# Patient Record
Sex: MALE | Race: WHITE | NOT HISPANIC OR LATINO | Employment: UNEMPLOYED | URBAN - METROPOLITAN AREA
[De-identification: names, ages, dates, MRNs, and addresses within clinical notes are randomized per-mention and may not be internally consistent; named-entity substitution may affect disease eponyms.]

---

## 2017-03-03 ENCOUNTER — OFFICE VISIT (OUTPATIENT)
Dept: MEDICAL GROUP | Facility: PHYSICIAN GROUP | Age: 57
End: 2017-03-03
Payer: MEDICAID

## 2017-03-03 VITALS
TEMPERATURE: 97 F | DIASTOLIC BLOOD PRESSURE: 90 MMHG | BODY MASS INDEX: 33.13 KG/M2 | HEIGHT: 73 IN | RESPIRATION RATE: 12 BRPM | WEIGHT: 250 LBS | OXYGEN SATURATION: 95 % | SYSTOLIC BLOOD PRESSURE: 132 MMHG | HEART RATE: 87 BPM

## 2017-03-03 DIAGNOSIS — Z09 HOSPITAL DISCHARGE FOLLOW-UP: ICD-10-CM

## 2017-03-03 DIAGNOSIS — F31.4 BIPOLAR DISORDER, CURRENT EPISODE DEPRESSED, SEVERE, WITHOUT PSYCHOTIC FEATURES (HCC): ICD-10-CM

## 2017-03-03 DIAGNOSIS — Z76.0 ENCOUNTER FOR MEDICATION REFILL: ICD-10-CM

## 2017-03-03 PROCEDURE — 99214 OFFICE O/P EST MOD 30 MIN: CPT | Performed by: NURSE PRACTITIONER

## 2017-03-03 RX ORDER — LURASIDONE HYDROCHLORIDE 60 MG/1
TABLET, FILM COATED ORAL
Refills: 0 | COMMUNITY
Start: 2016-12-30 | End: 2017-03-03

## 2017-03-03 RX ORDER — DOXEPIN HYDROCHLORIDE 25 MG/1
50 CAPSULE ORAL
Refills: 0 | COMMUNITY
Start: 2017-02-23 | End: 2017-04-12

## 2017-03-03 RX ORDER — ESCITALOPRAM OXALATE 10 MG/1
5 TABLET ORAL DAILY
Refills: 0 | COMMUNITY
Start: 2016-12-29 | End: 2017-03-03

## 2017-03-03 RX ORDER — LURASIDONE HYDROCHLORIDE 40 MG/1
TABLET, FILM COATED ORAL
Refills: 0 | COMMUNITY
Start: 2017-02-24 | End: 2017-05-08

## 2017-03-03 RX ORDER — LURASIDONE HYDROCHLORIDE 80 MG/1
TABLET, FILM COATED ORAL
Refills: 0 | COMMUNITY
Start: 2017-01-30 | End: 2017-03-03

## 2017-03-03 RX ORDER — ALPRAZOLAM 0.5 MG/1
0.5 TABLET ORAL 4 TIMES DAILY PRN
COMMUNITY
Start: 2017-03-01 | End: 2017-03-14 | Stop reason: SDUPTHER

## 2017-03-03 RX ORDER — QUETIAPINE FUMARATE 50 MG/1
TABLET, FILM COATED ORAL
COMMUNITY
Start: 2017-02-28 | End: 2017-03-14

## 2017-03-03 NOTE — MR AVS SNAPSHOT
"        Francois Luis   3/3/2017 4:20 PM   Office Visit   MRN: [id number]    Department:  [facility]   Dept Phone:  [phone]    Description:  Male : 1960   Provider:  JUANJO Miller           Reason for Visit     Hospital Follow-up recent [facility] visit      Allergies as of 3/3/2017     Allergen Noted Reactions    Diflunisal 2010       Sweating and weakness    Lidocaine 2009   Nausea    Nabumetone 2009   Nausea    Prednisone 2012       Agitation  and Depression      Vital Signs     Blood Pressure Pulse Temperature Respirations Height Weight    132/90 mmHg 87 36.1 °C (97 °F) 12 1.854 m (6' 1\") 113.399 kg (250 lb)    Body Mass Index Oxygen Saturation Smoking Status             32.99 kg/m2 95% Never Smoker          Basic Information     Date Of Birth Sex Race Ethnicity Preferred Language    1960 Male White Non- English      Problem List              ICD-10-CM Priority Class Noted - Resolved    Bipolar disorder (HCC) F31.9   2009 - Present    Irritable bowel K58.9   2009 - Present    Cervical stenosis of spinal canal M48.02   2009 - Present    ADHD (attention deficit hyperactivity disorder) F90.9   2010 - Present    Allergic rhinitis due to pollen J30.1   2010 - Present    Hypertension I10   2010 - Present    Complete rupture of rotator cuff M75.120   2012 - Present    Type 2 diabetes mellitus without complication (CMS-HCC) E11.9   8/10/2015 - Present    Hyperlipidemia E78.5   2015 - Present    Elevated liver enzymes R74.8   2016 - Present      Health Maintenance        Date Due Completion Dates    IMM HEP B VACCINE (1 of 3 - Primary Series) 1960 ---    IMM DTaP/Tdap/Td Vaccine (1 - Tdap) 6/10/1978 ---    IMM PNEUMOCOCCAL 19-64 (ADULT) MEDIUM RISK SERIES (1 of 1 - PPSV23) 6/10/1978 ---    URINE ACR / MICROALBUMIN 2007    DIABETES MONOFILAMENT / LE EXAM 2017 (Done)    Override on 2016: " Done    A1C SCREENING 5/8/2017 11/8/2016, 9/2/2015, 1/18/2006    RETINAL SCREENING 7/18/2017 7/18/2016, 4/30/2015    FASTING LIPID PROFILE 11/8/2017 11/8/2016, 9/2/2015, 1/18/2006    SERUM CREATININE 11/8/2017 11/8/2016, 9/2/2015, 8/21/2012, 2/27/2012, 7/25/2009, 1/18/2006    COLONOSCOPY 11/21/2024 11/21/2014 (Postponed)    Override on 11/21/2014: Postponed            Current Immunizations     Influenza TIV (IM) 7/1/2015    Influenza Vaccine Quad Inj (Preserved) 10/27/2016      Below and/or attached are the medications your provider expects you to take. Review all of your home medications and newly ordered medications with your provider and/or pharmacist. Follow medication instructions as directed by your provider and/or pharmacist. Please keep your medication list with you and share with your provider. Update the information when medications are discontinued, doses are changed, or new medications (including over-the-counter products) are added; and carry medication information at all times in the event of emergency situations     Allergies:  DIFLUNISAL - (reactions not documented)     LIDOCAINE - Nausea     NABUMETONE - Nausea     PREDNISONE - (reactions not documented)               Medications  Valid as of: March 03, 2017 -  5:03 PM    Generic Name Brand Name Tablet Size Instructions for use    ALPRAZolam (Tab) XANAX 0.5 MG Take 0.5 mg by mouth 4 times a day as needed.        AmLODIPine Besylate (Tab) NORVASC 2.5 MG Take 1 Tab by mouth every day.        Atorvastatin Calcium (Tab) LIPITOR 40 MG Take 1 Tab by mouth every bedtime.        DiphenhydrAMINE HCl (Tab) BENADRYL 25 MG Take 25 mg by mouth at bedtime as needed for Sleep.        Doxepin HCl (Cap) SINEQUAN 25 MG Take 50 mg by mouth every bedtime.        Hyoscyamine Sulfate (SL Tab) LEVSIN 0.125 MG DISSOLVE 1 TABLET BY MOUTH EVERY 4 HOURS AS NEEDED FOR CRAMPING        Lurasidone HCl (Tab) LATUDA 40 MG TAKE 1 TABLET BY MOUTH EVERY EVENING WITH MEALS         MetFORMIN HCl (Tab) GLUCOPHAGE 500 MG Take 1 Tab by mouth 2 times a day, with meals.        Metoprolol Tartrate (Tab) LOPRESSOR 25 MG Take 25 mg by mouth 2 times a day.        Psyllium (Pack) METAMUCIL 58.12 % Take 1 Packet by mouth every day.        QUEtiapine Fumarate (Tab) SEROQUEL 50 MG         .                 Medicines prescribed today were sent to:     [facility]ACY #[id number] - [place], [place] - [address]    [address] [facility] [place] [address]    Phone: [phone] Fax: [phone]    Open 24 Hours?: No      Medication refill instructions:       If your prescription bottle indicates you have medication refills left, it is not necessary to call your provider’s office. Please contact your pharmacy and they will refill your medication.    If your prescription bottle indicates you do not have any refills left, you may request refills at any time through one of the following ways: The online [employer] system (except Urgent Care), by calling your provider’s office, or by asking your pharmacy to contact your provider’s office with a refill request. Medication refills are processed only during regular business hours and may not be available until the next business day. Your provider may request additional information or to have a follow-up visit with you prior to refilling your medication.   *Please Note: Medication refills are assigned a new Rx number when refilled electronically. Your pharmacy may indicate that no refills were authorized even though a new prescription for the same medication is available at the pharmacy. Please request the medicine by name with the pharmacy before contacting your provider for a refill.           MyChart Status: Patient Declined

## 2017-03-14 ENCOUNTER — OFFICE VISIT (OUTPATIENT)
Dept: MEDICAL GROUP | Facility: PHYSICIAN GROUP | Age: 57
End: 2017-03-14
Payer: MEDICAID

## 2017-03-14 VITALS
TEMPERATURE: 96.6 F | BODY MASS INDEX: 33.4 KG/M2 | HEIGHT: 73 IN | HEART RATE: 67 BPM | OXYGEN SATURATION: 95 % | RESPIRATION RATE: 12 BRPM | DIASTOLIC BLOOD PRESSURE: 90 MMHG | SYSTOLIC BLOOD PRESSURE: 118 MMHG | WEIGHT: 252 LBS

## 2017-03-14 DIAGNOSIS — R00.2 INTERMITTENT PALPITATIONS: ICD-10-CM

## 2017-03-14 DIAGNOSIS — F31.4 BIPOLAR DISORDER, CURRENT EPISODE DEPRESSED, SEVERE, WITHOUT PSYCHOTIC FEATURES (HCC): ICD-10-CM

## 2017-03-14 PROCEDURE — 99214 OFFICE O/P EST MOD 30 MIN: CPT | Performed by: NURSE PRACTITIONER

## 2017-03-14 RX ORDER — ALPRAZOLAM 0.5 MG/1
0.5 TABLET ORAL 4 TIMES DAILY PRN
Qty: 120 TAB | Refills: 0 | Status: SHIPPED
Start: 2017-03-14 | End: 2018-08-13 | Stop reason: SDUPTHER

## 2017-03-14 RX ORDER — DIAZEPAM 10 MG/1
10 TABLET ORAL 2 TIMES DAILY PRN
Refills: 0 | COMMUNITY
Start: 2017-02-24 | End: 2017-03-14

## 2017-03-14 RX ORDER — PROPRANOLOL HYDROCHLORIDE 40 MG/1
40 TABLET ORAL 2 TIMES DAILY
Qty: 60 TAB | Refills: 0 | Status: SHIPPED | OUTPATIENT
Start: 2017-03-14 | End: 2017-04-11 | Stop reason: SDUPTHER

## 2017-03-14 NOTE — ASSESSMENT & PLAN NOTE
New problem diagnosed at [facility] a few weeks ago. He was started on metoprolol 25 mg BID after cardiac work up was negative. Patient has increased the metoprolol to 50 mg BID on his own as of the past week and he did notice improvement in heart pounding and palpitations, but they are still occurring frequently. They are worse at night.

## 2017-03-14 NOTE — ASSESSMENT & PLAN NOTE
Ongoing problem that patient feels is getting worse. He is followed by psychiatry very closely via phone and appointments monthly with Dr. Benavidez. Only 2 days ago, Seroquel was discontinued. Since being hospitalized, he has been using Xanax rather than Valium which he reports is working much better for his anxiety. Next appointment in two weeks with psych. Patient states he can contact him by phone anytime though for medication changes. At last visit, I had recommended different community resources for counseling, the patient reports these are booked out anywhere from 3-5 months therefore he did not make appointment. He denies any suicidal ideations but reports that he feels like his anxiety and depression is severe.

## 2017-03-14 NOTE — PROGRESS NOTES
Subjective:     Chief Complaint   Patient presents with   • Hospital Follow-up     recent [facility] visit       HPI  Francois Luis is a 56 y.o. male here today for hospital follow up visit. He was seen in [facility] ED multiple times for c/o palpitations while a patient with [facility] for his known history of Bipolar Disorder. Due to his recurrent c/o palpitations, he was admitted 2/28-3/1 for cardiac evaluation. Cardiac work up included a nuclear stress test which was negative for CAD. For his palpitations, he was started on metoprolol 25 mg BID. Valium was not working to manage his anxiety, so he was switched to Xanax.   Patient reports that his palpitations have improved, but not resolved. He continues to have major depressive disorder. He has appointment with psych scheduled next week. follows Dr. Yaya Benavidez. In review of hospital records, he was having suicidal ideations during one ED visit at [facility]. Patient reports that these suicidal ideations have resolved. He states he feels like his anxiety is still not well controlled, but that Dr. Benavidez is aware of this and they are working on finding correct dose of Latuda for him, and Seroquel has been started. Patient states he has no weapons at home. He states he knows to go to the ED if he develops any suicidal ideation again.        Diagnoses of Hospital discharge follow-up, Bipolar disorder, current episode depressed, severe, without psychotic features (CMS-HCC), and Encounter for medication refill were pertinent to this visit.    Allergies: Diflunisal; Lidocaine; Nabumetone; and Prednisone  Current medicines (including changes today)  Current Outpatient Prescriptions   Medication Sig Dispense Refill   • metoprolol (LOPRESSOR) 25 MG Tab Take 1 Tab by mouth 2 times a day. 60 Tab 5   • alprazolam (XANAX) 0.5 MG Tab Take 0.5 mg by mouth 4 times a day as needed.     • quetiapine (SEROQUEL) 50 MG tablet      • LATUDA 40 MG Tab TAKE 1 TABLET BY  "MOUTH EVERY EVENING WITH MEALS  0   • doxepin (SINEQUAN) 25 MG Cap Take 50 mg by mouth every bedtime.  0   • psyllium (METAMUCIL) 58.12 % Pack Take 1 Packet by mouth every day.     • amlodipine (NORVASC) 2.5 MG Tab Take 1 Tab by mouth every day. 90 Tab 3   • atorvastatin (LIPITOR) 40 MG Tab Take 1 Tab by mouth every bedtime. 90 Tab 3   • diphenhydrAMINE (BENADRYL) 25 MG Tab Take 25 mg by mouth at bedtime as needed for Sleep.     • hyoscyamine (LEVSIN) 0.125 MG SL Tab DISSOLVE 1 TABLET BY MOUTH EVERY 4 HOURS AS NEEDED FOR CRAMPING 30 Tab 3   • metformin (GLUCOPHAGE) 500 MG Tab TAKE 1 TAB BY MOUTH 2 TIMES A DAY, WITH MEALS. 180 Tab 1     No current facility-administered medications for this visit.       He  has a past medical history of Disc disorder; Anxiety; Arthritis; Bipolar affective (CMS-HCC); Depression; Hypertension; Indigestion; Other specified disorder of intestines; and Type II or unspecified type diabetes mellitus without mention of complication, not stated as uncontrolled.    Health Maintenance: deferred    ROS  As stated in HPI and additionally  Constitutional: +insomnia. +fatigue.  Lungs: No cough, sob, dyspnea  Cardiac: +palpitations. No chest pain, syncope or LE edema  Psych: +anxiety and depression. No hallucinations or suicidal ideation.      Objective:     Blood pressure 132/90, pulse 87, temperature 36.1 °C (97 °F), resp. rate 12, height 1.854 m (6' 1\"), weight 113.399 kg (250 lb), SpO2 95 %. Body mass index is 32.99 kg/(m^2).  Physical Exam:  General: Alert, oriented, in no acute distress.  Eye contact is fair, speech goal directed, affect flat   HEENT: conjunctiva non-injected, sclera non-icteric, EOMs intact.   Neck: No adenopathy or masses in the cervical or supraclavicular regions. No thyromegaly  Lungs: unlabored. clear to auscultation bilaterally with good excursion.  CV: regular rate and rhythm. No murmurs. No carotid bruits.   Ext: no edema, normal color and temperature. "         Assessment and Plan:   The following treatment plan was discussed  1. Hospital discharge follow-up  Stable in regards to cardiac standpoint, palpitations improving, but bipolar disorder not well controlled. Offered to increase metoprolol for better control of palpitations, patient declined. Suicide precautions discussed.     I have reviewed the hospital records from all ER and admission visits.    2. Bipolar disorder, current episode depressed, severe, without psychotic features (CMS-HCC)  Not well controlled. Patient has appointment set up next week to f/u with psych for continued medication management. He denies any suicidal ideation at this time and states he will go to ER if he develops of these thoughts.    3. Encounter for medication refill  metoprolol (LOPRESSOR) 25 MG Tab       Followup: Return in about 6 weeks (around 4/14/2017). sooner should new symptoms or problems arise.

## 2017-03-15 NOTE — PROGRESS NOTES
Subjective:     Chief Complaint   Patient presents with   • Follow-Up       HPI  Francois Luis is a 56 y.o. male here today for complaints of uncontrolled palpitations and refill for Xanax.    Bipolar disorder (HCC)  Ongoing problem that patient feels is getting worse. He is followed by psychiatry very closely via phone and appointments monthly with Dr. Benavidez. Only 2 days ago, Seroquel was discontinued. Since being hospitalized, he has been using Xanax rather than Valium which he reports is working much better for his anxiety. Next appointment in two weeks with psych. Patient states he can contact him by phone anytime though for medication changes. At last visit, I had recommended different community resources for counseling, the patient reports these are booked out anywhere from 3-5 months therefore he did not make appointment. He denies any suicidal ideations but reports that he feels like his anxiety and depression is severe.     Intermittent palpitations  New problem diagnosed at [facility] a few weeks ago. He was started on metoprolol 25 mg BID after cardiac work up was negative. Patient has increased the metoprolol to 50 mg BID on his own as of the past week and he did notice improvement in heart pounding and palpitations, but they are still occurring frequently. They are worse at night.          Diagnoses of Bipolar disorder, current episode depressed, severe, without psychotic features (CMS-HCC) and Intermittent palpitations were pertinent to this visit.    Allergies: Diflunisal; Lidocaine; Nabumetone; and Prednisone  Current medicines (including changes today)  Current Outpatient Prescriptions   Medication Sig Dispense Refill   • alprazolam (XANAX) 0.5 MG Tab Take 1 Tab by mouth 4 times a day as needed. 120 Tab 0   • propranolol (INDERAL) 40 MG Tab Take 1 Tab by mouth 2 times a day. STOP METOPROLOL 60 Tab 0   • metformin (GLUCOPHAGE) 500 MG Tab TAKE 1 TAB BY MOUTH 2 TIMES A DAY, WITH MEALS. 180 Tab 1   •  "LATUDA 40 MG Tab TAKE 1 TABLET BY MOUTH EVERY EVENING WITH MEALS  0   • doxepin (SINEQUAN) 25 MG Cap Take 50 mg by mouth every bedtime.  0   • psyllium (METAMUCIL) 58.12 % Pack Take 1 Packet by mouth every day.     • amlodipine (NORVASC) 2.5 MG Tab Take 1 Tab by mouth every day. 90 Tab 3   • atorvastatin (LIPITOR) 40 MG Tab Take 1 Tab by mouth every bedtime. 90 Tab 3   • diphenhydrAMINE (BENADRYL) 25 MG Tab Take 25 mg by mouth at bedtime as needed for Sleep.     • hyoscyamine (LEVSIN) 0.125 MG SL Tab DISSOLVE 1 TABLET BY MOUTH EVERY 4 HOURS AS NEEDED FOR CRAMPING 30 Tab 3     No current facility-administered medications for this visit.       He  has a past medical history of Disc disorder; Anxiety; Arthritis; Bipolar affective (CMS-HCC); Depression; Hypertension; Indigestion; Other specified disorder of intestines; and Type II or unspecified type diabetes mellitus without mention of complication, not stated as uncontrolled.      ROS  As stated in HPI and additionally  CV:+ Palpitations. No chest pain, WYLIE, or lower extremity edema  Pulm: No shortness of breath, dyspnea, or chest tightness  Psych: Positive anxiety and depression. Denies suicidal ideation     Objective:     Blood pressure 118/90, pulse 67, temperature 35.9 °C (96.6 °F), resp. rate 12, height 1.854 m (6' 1\"), weight 114.306 kg (252 lb), SpO2 95 %. Body mass index is 33.25 kg/(m^2).  Physical Exam:  General: Alert, oriented, in no acute distress.  Eye contact is good, speech goal directed, affect calm, flat, but smiling more than last visit  HEENT: conjunctiva non-injected, sclera non-icteric, EOMs intact.  Lungs: unlabored. clear to auscultation bilaterally with good excursion.  CV: regular rate and rhythm. No murmurs  Ext: no edema, normal color and temperature.         Assessment and Plan:   The following treatment plan was discussed  1. Bipolar disorder, current episode depressed, severe, without psychotic features (CMS-HCC)  not well controlled, " but patient is following psychiatry for management of medications. I will not refill Xanax for him at this time to last him until he gets to the appointment in 2 weeks. Discussed ER warning signs in regards to suicidal ideations.    alprazolam (XANAX) 0.5 MG Tab   2. Intermittent palpitations  Improved, but not controlled. Due to associated anxiety, propanolol may be another option for management of palpitations. We will start with 40 mg twice daily. I will call him by phone in 2 days to see how this is working for him    propranolol (INDERAL) 40 MG Tab       Followup: Return in about 2 weeks (around 3/28/2017). sooner should new symptoms or problems arise.

## 2017-04-11 RX ORDER — PROPRANOLOL HYDROCHLORIDE 40 MG/1
TABLET ORAL
Qty: 180 TAB | Refills: 3 | Status: SHIPPED | OUTPATIENT
Start: 2017-04-11 | End: 2018-03-30 | Stop reason: SDUPTHER

## 2017-04-12 ENCOUNTER — OFFICE VISIT (OUTPATIENT)
Dept: MEDICAL GROUP | Facility: PHYSICIAN GROUP | Age: 57
End: 2017-04-12

## 2017-04-12 VITALS
BODY MASS INDEX: 32.47 KG/M2 | SYSTOLIC BLOOD PRESSURE: 144 MMHG | HEART RATE: 67 BPM | WEIGHT: 245 LBS | TEMPERATURE: 98.1 F | OXYGEN SATURATION: 95 % | HEIGHT: 73 IN | RESPIRATION RATE: 14 BRPM | DIASTOLIC BLOOD PRESSURE: 88 MMHG

## 2017-04-12 DIAGNOSIS — E11.9 TYPE 2 DIABETES MELLITUS WITHOUT COMPLICATION, WITHOUT LONG-TERM CURRENT USE OF INSULIN (HCC): ICD-10-CM

## 2017-04-12 DIAGNOSIS — F31.60 BIPOLAR AFFECTIVE DISORDER, CURRENT EPISODE MIXED, CURRENT EPISODE SEVERITY UNSPECIFIED (HCC): ICD-10-CM

## 2017-04-12 DIAGNOSIS — I10 ESSENTIAL HYPERTENSION: ICD-10-CM

## 2017-04-12 PROCEDURE — 99213 OFFICE O/P EST LOW 20 MIN: CPT | Performed by: FAMILY MEDICINE

## 2017-04-12 RX ORDER — CLONIDINE HYDROCHLORIDE 0.1 MG/1
0.1 TABLET ORAL 2 TIMES DAILY
Qty: 60 TAB | Refills: 3 | Status: SHIPPED | OUTPATIENT
Start: 2017-04-12 | End: 2017-08-21 | Stop reason: SDUPTHER

## 2017-04-12 RX ORDER — CLONAZEPAM 1 MG/1
1 TABLET ORAL 4 TIMES DAILY
COMMUNITY
End: 2019-11-04

## 2017-04-12 RX ORDER — GABAPENTIN 300 MG/1
300 CAPSULE ORAL 3 TIMES DAILY
COMMUNITY
End: 2019-01-20

## 2017-04-12 RX ORDER — HYDROXYZINE 50 MG/1
50 TABLET, FILM COATED ORAL 3 TIMES DAILY PRN
COMMUNITY
End: 2017-05-17

## 2017-04-12 RX ORDER — AMLODIPINE BESYLATE 5 MG/1
5 TABLET ORAL
Qty: 30 TAB | Refills: 3 | Status: SHIPPED | OUTPATIENT
Start: 2017-04-12 | End: 2017-07-31 | Stop reason: SDUPTHER

## 2017-04-12 RX ORDER — HYDROCHLOROTHIAZIDE 12.5 MG/1
12.5 TABLET ORAL DAILY
Qty: 30 TAB | Refills: 3 | Status: SHIPPED | OUTPATIENT
Start: 2017-04-12 | End: 2017-07-31 | Stop reason: SDUPTHER

## 2017-04-12 RX ORDER — LURASIDONE HYDROCHLORIDE 60 MG/1
TABLET, FILM COATED ORAL
COMMUNITY
End: 2017-05-08

## 2017-04-12 RX ORDER — QUETIAPINE FUMARATE 50 MG/1
50 TABLET, FILM COATED ORAL 2 TIMES DAILY
COMMUNITY
End: 2017-05-08

## 2017-04-12 NOTE — MR AVS SNAPSHOT
"        Francois Luis   2017 4:00 PM   Office Visit   MRN: [id number]    Department:  [facility]   Dept Phone:  [phone]    Description:  Male : 1960   Provider:  Alvin Headley M.D.           Reason for Visit     Follow-Up htn       Allergies as of 2017     Allergen Noted Reactions    Diflunisal 2010       Sweating and weakness    Lidocaine 2009   Nausea    Lisinopril 2017       DIZZINESS    Nabumetone 2009   Nausea    Prednisone 2012       Agitation  and Depression      You were diagnosed with     Essential hypertension   [0294653]       Bipolar affective disorder, current episode mixed, current episode severity unspecified (CMS-HCC)   [8312055]       Type 2 diabetes mellitus without complication, without long-term current use of insulin (CMS-HCC)   [1034907]         Vital Signs     Blood Pressure Pulse Temperature Respirations Height Weight    144/88 mmHg 67 36.7 °C (98.1 °F) 14 1.854 m (6' 1\") 111.131 kg (245 lb)    Body Mass Index Oxygen Saturation Smoking Status             32.33 kg/m2 95% Never Smoker          Basic Information     Date Of Birth Sex Race Ethnicity Preferred Language    1960 Male White Non- English      Your appointments     May 17, 2017  1:20 PM   Established Patient with Alvin Headley M.D.   [address] [id number] [phone]           You will be receiving a confirmation call a few days before your appointment from our automated call confirmation system.              Problem List              ICD-10-CM Priority Class Noted - Resolved    Bipolar disorder (HCC) F31.9   2009 - Present    Irritable bowel K58.9   2009 - Present    Cervical stenosis of spinal canal M48.02   2009 - Present    ADHD (attention deficit hyperactivity disorder) F90.9   2010 - Present    Allergic rhinitis due to pollen J30.1   2010 - Present    Hypertension I10 "   4/29/2010 - Present    Complete rupture of rotator cuff M75.120   8/29/2012 - Present    Type 2 diabetes mellitus without complication (CMS-HCC) E11.9   8/10/2015 - Present    Hyperlipidemia E78.5   11/24/2015 - Present    Elevated liver enzymes R74.8   9/22/2016 - Present    Intermittent palpitations R00.2   3/14/2017 - Present      Health Maintenance        Date Due Completion Dates    IMM HEP B VACCINE (1 of 3 - Primary Series) 1960 ---    IMM DTaP/Tdap/Td Vaccine (1 - Tdap) 6/10/1978 ---    IMM PNEUMOCOCCAL 19-64 (ADULT) MEDIUM RISK SERIES (1 of 1 - PPSV23) 6/10/1978 ---    URINE ACR / MICROALBUMIN 1/18/2007 1/18/2006    DIABETES MONOFILAMENT / LE EXAM 1/5/2017 1/5/2016 (Done)    Override on 1/5/2016: Done    A1C SCREENING 5/8/2017 11/8/2016, 9/2/2015, 1/18/2006    RETINAL SCREENING 7/18/2017 7/18/2016, 4/30/2015    FASTING LIPID PROFILE 11/8/2017 11/8/2016, 9/2/2015, 1/18/2006    SERUM CREATININE 11/8/2017 11/8/2016, 9/2/2015, 8/21/2012, 2/27/2012, 7/25/2009, 1/18/2006    COLONOSCOPY 11/21/2024 11/21/2014 (Postponed)    Override on 11/21/2014: Postponed            Current Immunizations     Influenza TIV (IM) 7/1/2015    Influenza Vaccine Quad Inj (Preserved) 10/27/2016      Below and/or attached are the medications your provider expects you to take. Review all of your home medications and newly ordered medications with your provider and/or pharmacist. Follow medication instructions as directed by your provider and/or pharmacist. Please keep your medication list with you and share with your provider. Update the information when medications are discontinued, doses are changed, or new medications (including over-the-counter products) are added; and carry medication information at all times in the event of emergency situations     Allergies:  DIFLUNISAL - (reactions not documented)     LIDOCAINE - Nausea     LISINOPRIL - (reactions not documented)     NABUMETONE - Nausea     PREDNISONE - (reactions not  documented)               Medications  Valid as of: April 12, 2017 -  4:41 PM    Generic Name Brand Name Tablet Size Instructions for use    ALPRAZolam (Tab) XANAX 0.5 MG Take 1 Tab by mouth 4 times a day as needed.        AmLODIPine Besylate (Tab) NORVASC 5 MG Take 1 Tab by mouth every day.        Atorvastatin Calcium (Tab) LIPITOR 40 MG Take 1 Tab by mouth every bedtime.        ClonazePAM (Tab) KLONOPIN 1 MG Take 0.5 mg by mouth 2 times a day.        CloNIDine HCl (Tab) CATAPRES 0.1 MG Take 1 Tab by mouth 2 times a day.        DiphenhydrAMINE HCl (Tab) BENADRYL 25 MG Take 25 mg by mouth at bedtime as needed for Sleep.        Gabapentin (Cap) NEURONTIN 300 MG Take 300 mg by mouth 3 times a day.        HydroCHLOROthiazide (Tab) HYDRODIURIL 12.5 MG Take 1 Tab by mouth every day.        HydrOXYzine HCl (Tab) ATARAX 50 MG Take 50 mg by mouth 3 times a day as needed for Itching.        Hyoscyamine Sulfate (SL Tab) LEVSIN 0.125 MG DISSOLVE 1 TABLET BY MOUTH EVERY 4 HOURS AS NEEDED FOR CRAMPING        Lurasidone HCl (Tab) LATUDA 40 MG TAKE 1 TABLET BY MOUTH EVERY EVENING WITH MEALS        Lurasidone HCl (Tab) Lurasidone HCl 60 MG Take  by mouth.        MetFORMIN HCl (Tab) GLUCOPHAGE 500 MG TAKE 1 TAB BY MOUTH 2 TIMES A DAY, WITH MEALS.        Propranolol HCl (Tab) INDERAL 40 MG TAKE 1 TAB BY MOUTH 2 TIMES A DAY. STOP METOPROLOL        Psyllium (Pack) METAMUCIL 58.12 % Take 1 Packet by mouth every day.        QUEtiapine Fumarate (Tab) SEROQUEL 50 MG Take 50 mg by mouth 2 times a day.        .                 Medicines prescribed today were sent to:     [facility]/PHARMACY #[id number] - [place], [place] - [address]    [address] [facility] [place] [address]    Phone: [phone] Fax: [phone]    Open 24 Hours?: No      Medication refill instructions:       If your prescription bottle indicates you have medication refills left, it is not necessary to call your provider’s office. Please contact your pharmacy and they will refill your  medication.    If your prescription bottle indicates you do not have any refills left, you may request refills at any time through one of the following ways: The online [employer] system (except Urgent Care), by calling your provider’s office, or by asking your pharmacy to contact your provider’s office with a refill request. Medication refills are processed only during regular business hours and may not be available until the next business day. Your provider may request additional information or to have a follow-up visit with you prior to refilling your medication.   *Please Note: Medication refills are assigned a new Rx number when refilled electronically. Your pharmacy may indicate that no refills were authorized even though a new prescription for the same medication is available at the pharmacy. Please request the medicine by name with the pharmacy before contacting your provider for a refill.           [facility] Status: Patient Declined

## 2017-04-13 NOTE — PATIENT INSTRUCTIONS
Patient given written instructions regarding labs, imaging, medications, referrals, dietary and lifestyle management, and return visit.    Alvin Headley MD

## 2017-04-13 NOTE — PROGRESS NOTES
Patient comes in stating that he was admitted to [facility] with suicidal ideation and anxiety. He was transferred to [facility] when he was medically stable. A fasting blood sugar that was done on him while he was at [facility] was good at 106. While he was at [place] he was found to have quite high blood pressure. He was on amlodipine 2.5 mg by mouth daily and propranolol 40 mg by mouth PID. They added clonidine 0.1 mg by mouth twice a day. He brings in blood pressure readings which are still high. He denies chest pains or shortness of breath.  He is going to be seeing his psychiatrist tomorrow to adjust his psychotropic medications.  He says in the past he's been on lisinopril but it made him dizzy.    I reviewed the following    Past Medical History   Diagnosis Date   • Disc disorder      Dr. Medina   • Anxiety    • Arthritis    • Bipolar affective (CMS-[facility])    • Depression    • Hypertension    • Indigestion    • Other specified disorder of intestines      IBS   • Type II or unspecified type diabetes mellitus without mention of complication, not stated as uncontrolled         Past Surgical History   Procedure Laterality Date   • Carpal tunnel release  1980     B/L   • Other orthopedic surgery  1980     right knee   • Cervical disk and fusion anterior  8/4/2009     Performed by ABDULAZIZ MEDINA at SURGERY [facility] ORS   • Tonsillectomy  1980   • Other  1990     left heel spur removed   • Knee arthroscopy  2/29/2012     left; Performed by CHELLE MUSTAFA at SURGERY [facility] ORS   • Medial meniscectomy  2/29/2012     Performed by CHELLE MUSTAFA at [facility] ORS   • Other surgical procedure  1982     skin graft right hand   • Shoulder decompression arthroscopic  8/29/2012     Performed by CHELLE MUSTAFA at SURGERY [facility] ORS   • Clavicle distal excision  8/29/2012     Performed by CHELLE MUSTAFA at [facility] ORS   • Shoulder arthroscopy w/  bicipital tenodesis repair  8/29/2012     Performed by CHELLE MUSTAFA at SURGERY [facility] ORS       Allergies   Allergen Reactions   • Diflunisal      Sweating and weakness   • Lidocaine Nausea   • Lisinopril      DIZZINESS   • Nabumetone Nausea   • Prednisone      Agitation  and Depression       Current Outpatient Prescriptions   Medication Sig Dispense Refill   • gabapentin (NEURONTIN) 300 MG Cap Take 300 mg by mouth 3 times a day.     • quetiapine (SEROQUEL) 50 MG tablet Take 50 mg by mouth 2 times a day.     • Lurasidone HCl (LATUDA) 60 MG Tab Take  by mouth.     • hydrOXYzine (ATARAX) 50 MG Tab Take 50 mg by mouth 3 times a day as needed for Itching.     • clonazepam (KLONOPIN) 1 MG Tab Take 0.5 mg by mouth 2 times a day.     • clonidine (CATAPRES) 0.1 MG Tab Take 1 Tab by mouth 2 times a day. 60 Tab 3   • amlodipine (NORVASC) 5 MG Tab Take 1 Tab by mouth every day. 30 Tab 3   • hydrochlorothiazide (HYDRODIURIL) 12.5 MG tablet Take 1 Tab by mouth every day. 30 Tab 3   • propranolol (INDERAL) 40 MG Tab TAKE 1 TAB BY MOUTH 2 TIMES A DAY. STOP METOPROLOL 180 Tab 3   • metformin (GLUCOPHAGE) 500 MG Tab TAKE 1 TAB BY MOUTH 2 TIMES A DAY, WITH MEALS. 180 Tab 1   • alprazolam (XANAX) 0.5 MG Tab Take 1 Tab by mouth 4 times a day as needed. 120 Tab 0   • LATUDA 40 MG Tab TAKE 1 TABLET BY MOUTH EVERY EVENING WITH MEALS  0   • psyllium (METAMUCIL) 58.12 % Pack Take 1 Packet by mouth every day.     • atorvastatin (LIPITOR) 40 MG Tab Take 1 Tab by mouth every bedtime. 90 Tab 3   • diphenhydrAMINE (BENADRYL) 25 MG Tab Take 25 mg by mouth at bedtime as needed for Sleep.     • hyoscyamine (LEVSIN) 0.125 MG SL Tab DISSOLVE 1 TABLET BY MOUTH EVERY 4 HOURS AS NEEDED FOR CRAMPING 30 Tab 3     No current facility-administered medications for this visit.        Family History   Problem Relation Age of Onset   • Hypertension Father    • Cancer Father      oral cancer   • Hypertension Sister    • Cancer       lung cancer   •  Diabetes     • Stroke         Social History     Social History   • Marital Status: Single     Spouse Name: N/A   • Number of Children: N/A   • Years of Education: N/A     Occupational History   • Not on file.     Social History Main Topics   • Smoking status: Never Smoker    • Smokeless tobacco: Never Used   • Alcohol Use: No   • Drug Use: No   • Sexual Activity: Not Currently     Other Topics Concern   • Not on file     Social History Narrative          The patient is well-developed well-nourished and in no acute distress--he has a somewhat flattened affect    Pupils equally round and reactive to light    Ears normal    Nose normal    Throat clear    Neck supple no cervical adenopathy no thyromegaly    Chest clear to auscultation    Heart regular rhythm no murmur S3 or S4 appreciated    Back no CVA pain or spasm    Abdomen flat soft good bowel sounds no mass No hepatosplenomegaly no rebound    Skin no rashes or suspicious lesions    1. Essential hypertension --not well controlled yet  clonidine (CATAPRES) 0.1 MG Tab--continue one by mouth twice a day     amlodipine (NORVASC) 5 MG Tab--one by mouth daily-increase to this dose from 2.5 mg a day     hydrochlorothiazide (HYDRODIURIL) 12.5 MG tablet--add this at one by mouth daily   Continue propranolol 40 mg by mouth twice a day    2. Bipolar affective disorder, current episode mixed, current episode severity unspecified (CMS-HCC)   continue to see psychiatrist    3. Type 2 diabetes mellitus without complication, without long-term current use of insulin (CMS-HCC)   fairly well-controlled from blood sugar that was checked in [facility]      Recheck with me 3 weeks.    Please note that this dictation was created using voice recognition software. I have worked with consultants from the vendor as well as technical experts from [employer] to optimize the interface. I have made every reasonable attempt to correct obvious errors, but I expect that  there are errors of grammar and possibly content that I did not discover before finalizing the note.

## 2017-05-08 ENCOUNTER — OFFICE VISIT (OUTPATIENT)
Dept: MEDICAL GROUP | Facility: PHYSICIAN GROUP | Age: 57
End: 2017-05-08

## 2017-05-08 VITALS
OXYGEN SATURATION: 93 % | SYSTOLIC BLOOD PRESSURE: 110 MMHG | DIASTOLIC BLOOD PRESSURE: 78 MMHG | BODY MASS INDEX: 33.6 KG/M2 | TEMPERATURE: 97.6 F | HEART RATE: 81 BPM | WEIGHT: 240 LBS | HEIGHT: 71 IN

## 2017-05-08 DIAGNOSIS — R44.3 HALLUCINATIONS: ICD-10-CM

## 2017-05-08 DIAGNOSIS — R44.3 HALLUCINATION: ICD-10-CM

## 2017-05-08 PROCEDURE — 99214 OFFICE O/P EST MOD 30 MIN: CPT | Performed by: FAMILY MEDICINE

## 2017-05-08 RX ORDER — LURASIDONE HYDROCHLORIDE 80 MG/1
80 TABLET, FILM COATED ORAL
Qty: 30 TAB | Refills: 2 | Status: SHIPPED | OUTPATIENT
Start: 2017-05-08 | End: 2017-05-17 | Stop reason: SDUPTHER

## 2017-05-08 RX ORDER — DOXEPIN HYDROCHLORIDE 100 MG/1
100 CAPSULE ORAL NIGHTLY
COMMUNITY
End: 2019-01-20

## 2017-05-08 ASSESSMENT — PAIN SCALES - GENERAL: PAINLEVEL: 3=SLIGHT PAIN

## 2017-05-08 NOTE — MR AVS SNAPSHOT
"        Francois Luis   2017 1:40 PM   Office Visit   MRN: [id number]    Department:  [facility]   Dept Phone:  [phone]    Description:  Male : 1960   Provider:  Jignesh Timmons M.D.           Allergies as of 2017     Allergen Noted Reactions    Diflunisal 2010       Sweating and weakness    Lidocaine 2009   Nausea    Lisinopril 2017       DIZZINESS    Nabumetone 2009   Nausea    Prednisone 2012       Agitation  and Depression      You were diagnosed with     Bipolar affective disorder, current episode mixed, current episode severity unspecified (CMS-[facility])   [[id number]]         Vital Signs     Blood Pressure Pulse Temperature Height Weight Body Mass Index    110/78 mmHg 81 36.4 °C (97.6 °F) 1.803 m (5' 10.98\") 108.863 kg (240 lb) 33.49 kg/m2    Oxygen Saturation Smoking Status                93% Never Smoker           Basic Information     Date Of Birth Sex Race Ethnicity Preferred Language    1960 Male White Non- English      Your appointments     May 17, 2017  1:20 PM   Established Patient with Alvin Headley M.D.   [facility]    [address] [place] [id number] [phone]           You will be receiving a confirmation call a few days before your appointment from our automated call confirmation system.              Problem List              ICD-10-CM Priority Class Noted - Resolved    Bipolar disorder (HCC) F31.9   2009 - Present    Irritable bowel K58.9   2009 - Present    Cervical stenosis of spinal canal M48.02   2009 - Present    ADHD (attention deficit hyperactivity disorder) F90.9   2010 - Present    Allergic rhinitis due to pollen J30.1   2010 - Present    Hypertension I10   2010 - Present    Complete rupture of rotator cuff M75.120   2012 - Present    Type 2 diabetes mellitus without complication (CMS-[facility]) E11.9   8/10/2015 - Present    Hyperlipidemia E78.5   2015 " - Present    Elevated liver enzymes R74.8   9/22/2016 - Present    Intermittent palpitations R00.2   3/14/2017 - Present      Health Maintenance        Date Due Completion Dates    IMM HEP B VACCINE (1 of 3 - Primary Series) 1960 ---    IMM DTaP/Tdap/Td Vaccine (1 - Tdap) 6/10/1978 ---    IMM PNEUMOCOCCAL 19-64 (ADULT) MEDIUM RISK SERIES (1 of 1 - PPSV23) 6/10/1978 ---    URINE ACR / MICROALBUMIN 1/18/2007 1/18/2006    DIABETES MONOFILAMENT / LE EXAM 1/5/2017 1/5/2016 (Done)    Override on 1/5/2016: Done    A1C SCREENING 5/8/2017 11/8/2016, 9/2/2015, 1/18/2006    RETINAL SCREENING 7/18/2017 7/18/2016, 4/30/2015    FASTING LIPID PROFILE 11/8/2017 11/8/2016, 9/2/2015, 1/18/2006    SERUM CREATININE 11/8/2017 11/8/2016, 9/2/2015, 8/21/2012, 2/27/2012, 7/25/2009, 1/18/2006    COLONOSCOPY 11/21/2024 11/21/2014 (Postponed)    Override on 11/21/2014: Postponed            Current Immunizations     Influenza TIV (IM) 7/1/2015    Influenza Vaccine Quad Inj (Preserved) 10/27/2016      Below and/or attached are the medications your provider expects you to take. Review all of your home medications and newly ordered medications with your provider and/or pharmacist. Follow medication instructions as directed by your provider and/or pharmacist. Please keep your medication list with you and share with your provider. Update the information when medications are discontinued, doses are changed, or new medications (including over-the-counter products) are added; and carry medication information at all times in the event of emergency situations     Allergies:  DIFLUNISAL - (reactions not documented)     LIDOCAINE - Nausea     LISINOPRIL - (reactions not documented)     NABUMETONE - Nausea     PREDNISONE - (reactions not documented)               Medications  Valid as of: May 08, 2017 -  2:11 PM    Generic Name Brand Name Tablet Size Instructions for use    ALPRAZolam (Tab) XANAX 0.5 MG Take 1 Tab by mouth 4 times a day as needed.         AmLODIPine Besylate (Tab) NORVASC 5 MG Take 1 Tab by mouth every day.        Atorvastatin Calcium (Tab) LIPITOR 40 MG Take 1 Tab by mouth every bedtime.        ClonazePAM (Tab) KLONOPIN 1 MG Take 0.5 mg by mouth 2 times a day.        CloNIDine HCl (Tab) CATAPRES 0.1 MG Take 1 Tab by mouth 2 times a day.        DiphenhydrAMINE HCl (Tab) BENADRYL 25 MG Take 25 mg by mouth at bedtime as needed for Sleep.        Doxepin HCl (Cap) SINEQUAN 100 MG Take 100 mg by mouth every evening.        Gabapentin (Cap) NEURONTIN 300 MG Take 300 mg by mouth 3 times a day.        HydroCHLOROthiazide (Tab) HYDRODIURIL 12.5 MG Take 1 Tab by mouth every day.        HydrOXYzine HCl (Tab) ATARAX 50 MG Take 50 mg by mouth 3 times a day as needed for Itching.        Hyoscyamine Sulfate (SL Tab) LEVSIN 0.125 MG DISSOLVE 1 TABLET BY MOUTH EVERY 4 HOURS AS NEEDED FOR CRAMPING        Lurasidone HCl (Tab) LATUDA 80 MG Take 1 Tab by mouth with dinner.        MetFORMIN HCl (Tab) GLUCOPHAGE 500 MG TAKE 1 TAB BY MOUTH 2 TIMES A DAY, WITH MEALS.        Propranolol HCl (Tab) INDERAL 40 MG TAKE 1 TAB BY MOUTH 2 TIMES A DAY. STOP METOPROLOL        Psyllium (Pack) METAMUCIL 58.12 % Take 1 Packet by mouth every day.        .                 Medicines prescribed today were sent to:     [id number] - [place], [place] - [id number] [place] [address]    Phone: [phone] Fax: [phone]    Open 24 Hours?: No      Medication refill instructions:       If your prescription bottle indicates you have medication refills left, it is not necessary to call your provider’s office. Please contact your pharmacy and they will refill your medication.    If your prescription bottle indicates you do not have any refills left, you may request refills at any time through one of the following ways: The online [employer] system (except Urgent Care), by calling your provider’s office, or by asking your pharmacy to contact your provider’s office with a  refill request. Medication refills are processed only during regular business hours and may not be available until the next business day. Your provider may request additional information or to have a follow-up visit with you prior to refilling your medication.   *Please Note: Medication refills are assigned a new Rx number when refilled electronically. Your pharmacy may indicate that no refills were authorized even though a new prescription for the same medication is available at the pharmacy. Please request the medicine by name with the pharmacy before contacting your provider for a refill.           MyChart Status: Patient Declined

## 2017-05-08 NOTE — PROGRESS NOTES
Subjective:   Francois Luis is a 56 y.o. male here today with new complaint of auditory and visual hallucinations.    Hallucination  Patient is here today with complain of auditory and visual hallucinations for the last 2 weeks. He has history of bipolar disorder and is under the care of psychiatrist and also sees a therapist regularly. He was at his counseling session with his therapist today when he told his counselor that he has been having hallucinations for the last 2 weeks. Therefore he was sent to our clinic for further assessment. He has an upcoming appointment with his psychiatrist and also his PCP, Dr. Headley in June. He describes his hallucinations as both auditory and visual. Auditory hallucinations, mostly when he is in a quiet room and the family is working. He describes his hearing [place] music playing in the room. Also he was watching cartoons and on the television when he feels like he transported inside the cartoon. He also saw himself in a restaurant in [place]. He denies any thoughts or voices provoking him to hurt himself or anybody else. He is currently on Latuda 60 mg daily. He was also on Seroquel several months back which was stopped by his psychiatrist. He recently stopped taking clonidine which is for his blood pressure. His blood pressure is well controlled. He denies any other recent medication changes. He reports that he has a diagnosis of bipolar disorder since he was in his 20s. He does not recall experiencing hallucinations in the past.           Current medicines (including changes today)  Current Outpatient Prescriptions   Medication Sig Dispense Refill   • doxepin (SINEQUAN) 100 MG Cap Take 100 mg by mouth every evening.     • lurasidone (LATUDA) 80 MG Tab Take 1 Tab by mouth with dinner. 30 Tab 2   • gabapentin (NEURONTIN) 300 MG Cap Take 300 mg by mouth 3 times a day.     • hydrochlorothiazide (HYDRODIURIL) 12.5 MG tablet Take 1 Tab by mouth every day. 30 Tab 3   • propranolol  "(INDERAL) 40 MG Tab TAKE 1 TAB BY MOUTH 2 TIMES A DAY. STOP METOPROLOL 180 Tab 3   • metformin (GLUCOPHAGE) 500 MG Tab TAKE 1 TAB BY MOUTH 2 TIMES A DAY, WITH MEALS. 180 Tab 1   • atorvastatin (LIPITOR) 40 MG Tab Take 1 Tab by mouth every bedtime. 90 Tab 3   • diphenhydrAMINE (BENADRYL) 25 MG Tab Take 25 mg by mouth at bedtime as needed for Sleep.     • hyoscyamine (LEVSIN) 0.125 MG SL Tab DISSOLVE 1 TABLET BY MOUTH EVERY 4 HOURS AS NEEDED FOR CRAMPING 30 Tab 2   • hydrOXYzine (ATARAX) 50 MG Tab Take 50 mg by mouth 3 times a day as needed for Itching.     • clonazepam (KLONOPIN) 1 MG Tab Take 0.5 mg by mouth 2 times a day.     • clonidine (CATAPRES) 0.1 MG Tab Take 1 Tab by mouth 2 times a day. 60 Tab 3   • amlodipine (NORVASC) 5 MG Tab Take 1 Tab by mouth every day. 30 Tab 3   • alprazolam (XANAX) 0.5 MG Tab Take 1 Tab by mouth 4 times a day as needed. 120 Tab 0   • psyllium (METAMUCIL) 58.12 % Pack Take 1 Packet by mouth every day.       No current facility-administered medications for this visit.     He  has a past medical history of Disc disorder; Anxiety; Arthritis; Bipolar affective (CMS-HCC); Depression; Hypertension; Indigestion; Other specified disorder of intestines; and Type II or unspecified type diabetes mellitus without mention of complication, not stated as uncontrolled.    ROS   No chest pain, no shortness of breath, no abdominal pain       Objective:     Blood pressure 110/78, pulse 81, temperature 36.4 °C (97.6 °F), height 1.803 m (5' 10.98\"), weight 108.863 kg (240 lb), SpO2 93 %. Body mass index is 33.49 kg/(m^2).     PHYSICAL EXAM     GEN: Alert and oriented,well appearing, no acute distress  SKIN: warm, dry to touch, no rashes or lesions in visible areas  PSYCH: mood and affect normal, judgement normal  EYES: Conjunctiva clear, lids normal,pupils equal round and reactive  RESPIRATORY : Unlabored respiratory effort, no distress noted, clear to auscultation bilaterally, no wheeze, rhonchi, " crackles  CARDIOVASCULAR: RRR, S1 S2 normal      Assessment and Plan:   The following treatment plan was discussed    1. Hallucinations  New problem.Patient has long standing diagnosis of Bipolar Disorder.He is currently on Latuda 60 mg daily.Will increase Latuda to 80 mg daily.  Advised patient to call his Psychiatrist and let him know about the symptoms and the medication change  Monitor symptoms  - lurasidone (LATUDA) 80 MG Tab; Take 1 Tab by mouth with dinner.  Dispense: 30 Tab; Refill: 2        Followup: Return in about 2 weeks (around 5/22/2017), or if symptoms worsen or fail to improve.         Please note that this dictation was created using voice recognition software. I have made every reasonable attempt to correct obvious errors, but I expect that there are errors of grammar and possibly content that I did not discover before finalizing the note.

## 2017-05-08 NOTE — ASSESSMENT & PLAN NOTE
Patient is here today with complain of auditory and visual hallucinations for the last 2 weeks. He has history of bipolar disorder and is under the care of psychiatrist and also sees a therapist regularly. He was at his counseling session with his therapist today when he told his counselor that he has been having hallucinations for the last 2 weeks. Therefore he was sent to our clinic for further assessment. He has an upcoming appointment with his psychiatrist and also his PCP, Dr. Headley in June. He describes his hallucinations as both auditory and visual. Auditory hallucinations, mostly when he is in a quiet room and the family is working. He describes his hearing English music playing in the room. Also he was watching cartoons and on the television when he feels like he transported inside the cartoon. He also saw himself in a restaurant in [place]. He denies any thoughts or voices provoking him to hurt himself or anybody else. He is currently on Latuda 60 mg daily. He was also on Seroquel several months back which was stopped by his psychiatrist. He recently stopped taking clonidine which is for his blood pressure. His blood pressure is well controlled. He denies any other recent medication changes. He reports that he has a diagnosis of bipolar disorder since he was in his 20s. He does not recall experiencing hallucinations in the past.

## 2017-05-17 ENCOUNTER — OFFICE VISIT (OUTPATIENT)
Dept: MEDICAL GROUP | Facility: PHYSICIAN GROUP | Age: 57
End: 2017-05-17

## 2017-05-17 VITALS
OXYGEN SATURATION: 94 % | RESPIRATION RATE: 12 BRPM | HEART RATE: 90 BPM | HEIGHT: 70 IN | DIASTOLIC BLOOD PRESSURE: 60 MMHG | SYSTOLIC BLOOD PRESSURE: 130 MMHG | WEIGHT: 240 LBS | TEMPERATURE: 97 F | BODY MASS INDEX: 34.36 KG/M2

## 2017-05-17 DIAGNOSIS — I10 ESSENTIAL HYPERTENSION: ICD-10-CM

## 2017-05-17 DIAGNOSIS — R44.3 HALLUCINATIONS: ICD-10-CM

## 2017-05-17 DIAGNOSIS — F31.5 BIPOLAR DISORDER, CURRENT EPISODE DEPRESSED, SEVERE, WITH PSYCHOTIC FEATURES (HCC): ICD-10-CM

## 2017-05-17 DIAGNOSIS — F90.0 ATTENTION DEFICIT HYPERACTIVITY DISORDER (ADHD), PREDOMINANTLY INATTENTIVE TYPE: ICD-10-CM

## 2017-05-17 PROBLEM — F31.9 AFFECTIVE PSYCHOSIS, BIPOLAR (HCC): Status: ACTIVE | Noted: 2017-05-17

## 2017-05-17 PROCEDURE — 99213 OFFICE O/P EST LOW 20 MIN: CPT | Performed by: FAMILY MEDICINE

## 2017-05-17 RX ORDER — BUSPIRONE HYDROCHLORIDE 30 MG/1
30 TABLET ORAL 3 TIMES DAILY
Qty: 90 TAB | Refills: 3
Start: 2017-05-17 | End: 2019-01-20

## 2017-05-17 RX ORDER — HYDROXYZINE PAMOATE 50 MG/1
50 CAPSULE ORAL 3 TIMES DAILY PRN
COMMUNITY
End: 2019-01-20

## 2017-05-17 RX ORDER — LURASIDONE HYDROCHLORIDE 80 MG/1
40 TABLET, FILM COATED ORAL
Qty: 30 TAB | Refills: 2
Start: 2017-05-17 | End: 2019-01-20

## 2017-05-17 NOTE — PROGRESS NOTES
Patient returns to recheck his blood pressure. He is tolerating the increased dose of amlodipine without problems. Does not have swelling in his ankles.  He has no chest pains and no shortness of breath.  He continues to have hallucinations up until about a week and a half ago. His psychiatrist has been adjusting his medications and he says this is finally helping. He sees Dr. Shaver. He will be seeing him again next week.    I reviewed the following    Past Medical History   Diagnosis Date   • Disc disorder      Dr. Medina   • Anxiety    • Arthritis    • Bipolar affective (CMS-[facility])    • Depression    • Hypertension    • Indigestion    • Other specified disorder of intestines      IBS   • Type II or unspecified type diabetes mellitus without mention of complication, not stated as uncontrolled         Past Surgical History   Procedure Laterality Date   • Carpal tunnel release  1980     B/L   • Other orthopedic surgery  1980     right knee   • Cervical disk and fusion anterior  8/4/2009     Performed by ABDULAZIZ MEDINA at SURGERY [facility]   • Tonsillectomy  1980   • Other  1990     left heel spur removed   • Knee arthroscopy  2/29/2012     left; Performed by CHELLE MUSTAFA at [facility]   • Medial meniscectomy  2/29/2012     Performed by CHELLE MUSTAFA at [facility]   • Other surgical procedure  1982     skin graft right hand   • Shoulder decompression arthroscopic  8/29/2012     Performed by CHELLE MUSTAFA at [facility]   • Clavicle distal excision  8/29/2012     Performed by CHELLE MUSTAFA at [facility]   • Shoulder arthroscopy w/ bicipital tenodesis repair  8/29/2012     Performed by CHELLE MUSTAFA at [facility]       Allergies   Allergen Reactions   • Diflunisal      Sweating and weakness   • Lidocaine Nausea   • Lisinopril      DIZZINESS   • Nabumetone Nausea   • Prednisone      Agitation  and Depression       Current Outpatient Prescriptions    Medication Sig Dispense Refill   • hydrOXYzine (VISTARIL) 50 MG Cap Take 50 mg by mouth 3 times a day as needed for Itching.     • busPIRone (BUSPAR) 30 MG tablet Take 1 Tab by mouth 3 times a day. 90 Tab 3   • lurasidone (LATUDA) 80 MG Tab Take 0.5 Tabs by mouth with dinner. 30 Tab 2   • gabapentin (NEURONTIN) 300 MG Cap Take 300 mg by mouth 3 times a day.     • amlodipine (NORVASC) 5 MG Tab Take 1 Tab by mouth every day. 30 Tab 3   • hydrochlorothiazide (HYDRODIURIL) 12.5 MG tablet Take 1 Tab by mouth every day. 30 Tab 3   • propranolol (INDERAL) 40 MG Tab TAKE 1 TAB BY MOUTH 2 TIMES A DAY. STOP METOPROLOL 180 Tab 3   • metformin (GLUCOPHAGE) 500 MG Tab TAKE 1 TAB BY MOUTH 2 TIMES A DAY, WITH MEALS. 180 Tab 1   • atorvastatin (LIPITOR) 40 MG Tab Take 1 Tab by mouth every bedtime. 90 Tab 3   • doxepin (SINEQUAN) 100 MG Cap Take 100 mg by mouth every evening.     • hyoscyamine (LEVSIN) 0.125 MG SL Tab DISSOLVE 1 TABLET BY MOUTH EVERY 4 HOURS AS NEEDED FOR CRAMPING 30 Tab 2   • clonazepam (KLONOPIN) 1 MG Tab Take 0.5 mg by mouth 2 times a day.     • clonidine (CATAPRES) 0.1 MG Tab Take 1 Tab by mouth 2 times a day. 60 Tab 3   • alprazolam (XANAX) 0.5 MG Tab Take 1 Tab by mouth 4 times a day as needed. 120 Tab 0   • psyllium (METAMUCIL) 58.12 % Pack Take 1 Packet by mouth every day.     • diphenhydrAMINE (BENADRYL) 25 MG Tab Take 100 mg by mouth 2 Times a Day.       No current facility-administered medications for this visit.        Family History   Problem Relation Age of Onset   • Hypertension Father    • Cancer Father      oral cancer   • Hypertension Sister    • Cancer       lung cancer   • Diabetes     • Stroke         Social History     Social History   • Marital Status: Single     Spouse Name: N/A   • Number of Children: N/A   • Years of Education: N/A     Occupational History   • Not on file.     Social History Main Topics   • Smoking status: Never Smoker    • Smokeless tobacco: Never Used   • Alcohol Use:  No   • Drug Use: No   • Sexual Activity: Not Currently     Other Topics Concern   • Not on file     Social History Narrative        The patient is well-developed well-nourished and in no acute distress--he has a somewhat flattened affect    Pupils equally round and reactive to light    Ears normal    Nose normal    Throat clear    Neck supple no cervical adenopathy no thyromegaly    Chest clear to auscultation    Heart regular rhythm no murmur S3 or S4 appreciated    Back no CVA pain or spasm    Abdomen flat soft good bowel sounds no mass No hepatosplenomegaly no rebound    Skin no rashes or suspicious lesions    DTRs 2+ in ankles knees and biceps    Babinski downgoing bilaterally    Pulses 2+ in all 4 extremities    1. Hallucinations  lurasidone (LATUDA) 80 MG Tab--patient gets this from his psychiatrist    2. Bipolar disorder, current episode depressed, severe, with psychotic features (CMS-HCC)  busPIRone (BUSPAR) 30 MG tablet--patient gets this from his psychiatrist     lurasidone (LATUDA) 80 MG Tab--patient gets this from his psychiatrist    3. Essential hypertension   controlled   No change in medications.    4. Attention deficit hyperactivity disorder (ADHD), predominantly inattentive type   doing well      Recheck when necessary    Please note that this dictation was created using voice recognition software. I have worked with consultants from the vendor as well as technical experts from [facility] [employer] to optimize the interface. I have made every reasonable attempt to correct obvious errors, but I expect that there are errors of grammar and possibly content that I did not discover before finalizing the note.

## 2017-07-17 RX ORDER — ETODOLAC 400 MG/1
TABLET, FILM COATED ORAL
Qty: 180 TAB | Refills: 3 | Status: SHIPPED | OUTPATIENT
Start: 2017-07-17 | End: 2019-01-20

## 2017-07-31 RX ORDER — AMLODIPINE BESYLATE 5 MG/1
TABLET ORAL
Qty: 90 TAB | Refills: 3 | Status: SHIPPED | OUTPATIENT
Start: 2017-07-31 | End: 2018-06-21 | Stop reason: SDUPTHER

## 2017-07-31 RX ORDER — HYDROCHLOROTHIAZIDE 12.5 MG/1
TABLET ORAL
Qty: 90 TAB | Refills: 3 | Status: SHIPPED | OUTPATIENT
Start: 2017-07-31 | End: 2018-05-21 | Stop reason: SDUPTHER

## 2017-08-21 RX ORDER — CLONIDINE HYDROCHLORIDE 0.1 MG/1
TABLET ORAL
Qty: 180 TAB | Refills: 3 | Status: SHIPPED | OUTPATIENT
Start: 2017-08-21 | End: 2019-01-20

## 2018-05-21 RX ORDER — HYDROCHLOROTHIAZIDE 12.5 MG/1
TABLET ORAL
Qty: 90 TAB | Refills: 0 | Status: SHIPPED | OUTPATIENT
Start: 2018-05-21 | End: 2018-06-21 | Stop reason: SDUPTHER

## 2018-07-29 NOTE — TELEPHONE ENCOUNTER
Was the patient seen in the last year in this department? No LOV 05/17/17 UP COMING ZAC 0/13/18 NO LABS ORDERED    Does patient have an active prescription for medications requested? No     Received Request Via: Pharmacy

## 2018-07-30 NOTE — TELEPHONE ENCOUNTER
Requested Prescriptions     Signed Prescriptions Disp Refills   • metFORMIN (GLUCOPHAGE) 500 MG Tab 180 Tab 0     Sig: TAKE 1 TAB BY MOUTH 2 TIMES A DAY, WITH MEALS.     Authorizing Provider: DENNIS CHOU A.P.R.N.

## 2018-08-08 ENCOUNTER — TELEPHONE (OUTPATIENT)
Dept: MEDICAL GROUP | Facility: PHYSICIAN GROUP | Age: 58
End: 2018-08-08

## 2018-08-08 DIAGNOSIS — I10 ESSENTIAL HYPERTENSION: ICD-10-CM

## 2018-08-08 RX ORDER — HYDROCHLOROTHIAZIDE 25 MG/1
25 TABLET ORAL DAILY
Qty: 90 TAB | Refills: 3 | Status: SHIPPED | OUTPATIENT
Start: 2018-08-08 | End: 2019-01-20

## 2018-08-08 RX ORDER — AMLODIPINE BESYLATE 10 MG/1
10 TABLET ORAL DAILY
Qty: 90 TAB | Refills: 3 | Status: SHIPPED | OUTPATIENT
Start: 2018-08-08 | End: 2019-08-16 | Stop reason: SDUPTHER

## 2018-08-08 NOTE — TELEPHONE ENCOUNTER
VOICEMAIL  1. Caller Name: Chiquita at [facility]                      Call Back Number: [phone] ext [phone]    2. Message: when pt was in yesterday experience a panic attack that lasted 2 hours. The panic attack increased his BP and pulse to  170/113  At end of appt he was at 160/112.  Pt stated that he can experience panic attacks to last up to 6 hours Dr Campuzano increased pt's Clonopin to 1 mg BID. Short term xanax of 1 mg for 7 days.      Dr Campuzano want to make you aware incase you wanted to increase pt's HTN medications     3. Patient approves office to leave a detailed voicemail/[place] message: N\A

## 2018-08-08 NOTE — TELEPHONE ENCOUNTER
Increase amlodipine to 10 mg a day.  Increase hydrochlorothiazide to 25 mg a day.  I have sent new prescriptions for both of these increased doses to [facility] for him.    Alvin Headley MD

## 2018-08-13 ENCOUNTER — OFFICE VISIT (OUTPATIENT)
Dept: MEDICAL GROUP | Facility: PHYSICIAN GROUP | Age: 58
End: 2018-08-13

## 2018-08-13 VITALS
HEIGHT: 70 IN | OXYGEN SATURATION: 95 % | BODY MASS INDEX: 39.94 KG/M2 | WEIGHT: 279 LBS | RESPIRATION RATE: 16 BRPM | HEART RATE: 96 BPM | SYSTOLIC BLOOD PRESSURE: 106 MMHG | DIASTOLIC BLOOD PRESSURE: 82 MMHG | TEMPERATURE: 98.2 F

## 2018-08-13 DIAGNOSIS — I10 ESSENTIAL HYPERTENSION: ICD-10-CM

## 2018-08-13 DIAGNOSIS — F90.0 ATTENTION DEFICIT HYPERACTIVITY DISORDER (ADHD), PREDOMINANTLY INATTENTIVE TYPE: ICD-10-CM

## 2018-08-13 DIAGNOSIS — F31.4 BIPOLAR DISORDER, CURRENT EPISODE DEPRESSED, SEVERE, WITHOUT PSYCHOTIC FEATURES (HCC): ICD-10-CM

## 2018-08-13 DIAGNOSIS — E11.9 TYPE 2 DIABETES MELLITUS WITHOUT COMPLICATION, WITHOUT LONG-TERM CURRENT USE OF INSULIN (HCC): ICD-10-CM

## 2018-08-13 DIAGNOSIS — E66.01 MORBID OBESITY WITH BMI OF 40.0-44.9, ADULT (HCC): ICD-10-CM

## 2018-08-13 DIAGNOSIS — F31.5 BIPOLAR DISORDER, CURRENT EPISODE DEPRESSED, SEVERE, WITH PSYCHOTIC FEATURES (HCC): ICD-10-CM

## 2018-08-13 RX ORDER — ALPRAZOLAM 1 MG/1
1 TABLET ORAL PRN
Qty: 60 TAB | Refills: 3
Start: 2018-08-13 | End: 2018-10-12

## 2018-08-13 ASSESSMENT — PATIENT HEALTH QUESTIONNAIRE - PHQ9
SUM OF ALL RESPONSES TO PHQ QUESTIONS 1-9: 9
CLINICAL INTERPRETATION OF PHQ2 SCORE: 1
5. POOR APPETITE OR OVEREATING: 3 - NEARLY EVERY DAY

## 2018-08-14 NOTE — PATIENT INSTRUCTIONS
Patient given written instructions regarding labs,  medications, referrals, dietary and lifestyle management, and return visit.    Alvin Headley MD

## 2018-08-14 NOTE — PROGRESS NOTES
Patient comes in with several issues.  He has had a worsening of his panic attacks.  He has been seeing his psychiatrist is adjusting his medications.  In the meantime his blood pressures been found to be spiking and getting quite high.  I was notified about this by [facility] services.  I increased his amlodipine to 10 mg a day and increased his hydrochlorothiazide to 25 mg a day.  I left his other medications where they are.  He is tolerating these increased doses well.  He has no chest pains no shortness of breath.  He brings in lab  results from April of this year that looked good.  He said lately with his panic attacks getting worse he has been getting blood sugars as high as 191.    I reviewed the following    Past Medical History:   Diagnosis Date   • Anxiety    • Arthritis    • Bipolar affective (HCC)    • Depression    • Disc disorder     Dr. Medina   • Hypertension    • Indigestion    • Other specified disorder of intestines     IBS   • Type II or unspecified type diabetes mellitus without mention of complication, not stated as uncontrolled         Past Surgical History:   Procedure Laterality Date   • SHOULDER DECOMPRESSION ARTHROSCOPIC  8/29/2012    Performed by CHELLE MUSTAFA at [facility]   • CLAVICLE DISTAL EXCISION  8/29/2012    Performed by CHELLE MUSTAFA at [facility]   • SHOULDER ARTHROSCOPY W/ BICIPITAL TENODESIS REPAIR  8/29/2012    Performed by CHELLE MUSTAFA at [facility]   • KNEE ARTHROSCOPY  2/29/2012    left; Performed by CHELLE MUSTAFA at [facility]   • MEDIAL MENISCECTOMY  2/29/2012    Performed by CHELLE MUSTAFA at [facility]   • CERVICAL DISK AND FUSION ANTERIOR  8/4/2009    Performed by ABDULAZIZ MEDINA at SURGERY [facility]   • OTHER  1990    left heel spur removed   • OTHER SURGICAL PROCEDURE  1982    skin graft right hand   • CARPAL TUNNEL RELEASE  1980    B/L   • OTHER ORTHOPEDIC SURGERY  1980    right knee   •  TONSILLECTOMY  1980       Allergies   Allergen Reactions   • Diflunisal      Sweating and weakness   • Lidocaine Nausea   • Lisinopril      DIZZINESS   • Nabumetone Nausea   • Prednisone      Agitation  and Depression       Current Outpatient Prescriptions   Medication Sig Dispense Refill   • metFORMIN (GLUCOPHAGE) 500 MG Tab Take 2 Tabs with breakfast and 1 Tab with dinner 270 Tab 3   • ALPRAZolam (XANAX) 1 MG Tab Take 1 Tab by mouth as needed for up to 60 days. Prescribed by Psychiatrist 60 Tab 3   • amLODIPine (NORVASC) 10 MG Tab Take 1 Tab by mouth every day. 90 Tab 3   • hydroCHLOROthiazide (HYDRODIURIL) 25 MG Tab Take 1 Tab by mouth every day. 90 Tab 3   • propranolol (INDERAL) 40 MG Tab TAKE 1 TAB BY MOUTH 2 TIMES A DAY. STOP METOPROLOL 90 Tab 3   • atorvastatin (LIPITOR) 40 MG Tab TAKE 1 TAB BY MOUTH EVERY BEDTIME. 90 Tab 3   • doxepin (SINEQUAN) 100 MG Cap Take 100 mg by mouth every evening.     • clonazepam (KLONOPIN) 1 MG Tab Take 0.5 mg by mouth 2 times a day.     • psyllium (METAMUCIL) 58.12 % Pack Take 1 Packet by mouth every day.     • clonidine (CATAPRES) 0.1 MG Tab TAKE 1 TAB BY MOUTH 2 TIMES A DAY. 180 Tab 3   • etodolac (LODINE) 400 MG tablet TAKE 1 TABLET BY MOUTH TWICE DAILY WITH FOOD 180 Tab 3   • hydrOXYzine (VISTARIL) 50 MG Cap Take 50 mg by mouth 3 times a day as needed for Itching.     • busPIRone (BUSPAR) 30 MG tablet Take 1 Tab by mouth 3 times a day. 90 Tab 3   • lurasidone (LATUDA) 80 MG Tab Take 0.5 Tabs by mouth with dinner. 30 Tab 2   • hyoscyamine (LEVSIN) 0.125 MG SL Tab DISSOLVE 1 TABLET BY MOUTH EVERY 4 HOURS AS NEEDED FOR CRAMPING 30 Tab 2   • gabapentin (NEURONTIN) 300 MG Cap Take 300 mg by mouth 3 times a day.     • diphenhydrAMINE (BENADRYL) 25 MG Tab Take 100 mg by mouth 2 Times a Day.       No current facility-administered medications for this visit.         Family History   Problem Relation Age of Onset   • Hypertension Father    • Cancer Father         oral cancer   •  Hypertension Sister    • Cancer Unknown         lung cancer   • Diabetes Unknown    • Stroke Unknown        Social History     Social History   • Marital status: Single     Spouse name: N/A   • Number of children: N/A   • Years of education: N/A     Occupational History   • Not on file.     Social History Main Topics   • Smoking status: Never Smoker   • Smokeless tobacco: Never Used   • Alcohol use No   • Drug use: No   • Sexual activity: Not Currently     Other Topics Concern   • Not on file     Social History Narrative   • No narrative on file      The patient is well-developed morbidly obese and in no acute distress    Pupils equally round and reactive to light    Ears normal    Nose normal    Throat clear    Neck supple no cervical adenopathy no thyromegaly    Chest clear to auscultation    Heart regular rhythm no murmur S3 or S4 appreciated    Back no CVA pain or spasm    Abdomen flat soft good bowel sounds no mass No hepatosplenomegaly no rebound    Skin no rashes or suspicious lesions    DTRs 2+ in ankles knees and biceps    Babinski downgoing bilaterally    Pulses 2+ in all 4 extremities    1. Essential hypertension   control is improved   2. Type 2 diabetes mellitus without complication, without long-term current use of insulin (HCC) we need better control of this-- metFORMIN (GLUCOPHAGE) 500 MG Tab--increased to 2 in the morning with breakfast and 1 in the evening with dinner   3. Bipolar disorder, current episode depressed, severe, with psychotic features (HCC)   continue to see psychiatrist   4. Attention deficit hyperactivity disorder (ADHD), predominantly inattentive type   continue to see psychiatrist   5. Bipolar disorder, current episode depressed, severe, without psychotic features (HCC)  ALPRAZolam (XANAX) 1 MG Tab continue to see psychiatrist        6. Morbid obesity with BMI of 40.0-44.9, adult ([facility])  Patient identified as having weight management issue.  Appropriate orders and counseling given.      Recheck with me as needed.  Patient has no insurance and has very limited financial means.  I did not charge him for today's visit.  His medications are covered by Medicaid he says.    Please note that this dictation was created using voice recognition software. I have worked with consultants from the vendor as well as technical experts from [facility] [address] [facility] to optimize the interface. I have made every reasonable attempt to correct obvious errors, but I expect that there are errors of grammar and possibly content that I did not discover before finalizing the note.

## 2018-10-24 DIAGNOSIS — E11.9 TYPE 2 DIABETES MELLITUS WITHOUT COMPLICATION, WITHOUT LONG-TERM CURRENT USE OF INSULIN (HCC): ICD-10-CM

## 2018-10-24 DIAGNOSIS — E78.2 MIXED HYPERLIPIDEMIA: ICD-10-CM

## 2018-10-24 RX ORDER — ATORVASTATIN CALCIUM 40 MG/1
40 TABLET, FILM COATED ORAL
Qty: 90 TAB | Refills: 0 | Status: ON HOLD | OUTPATIENT
Start: 2018-10-24 | End: 2019-01-23 | Stop reason: SDUPTHER

## 2018-10-24 NOTE — TELEPHONE ENCOUNTER
Requested Prescriptions     Signed Prescriptions Disp Refills   • atorvastatin (LIPITOR) 40 MG Tab 90 Tab 0     Sig: TAKE 1 TAB BY MOUTH EVERY BEDTIME.     Authorizing Provider: DENNIS CHOU A.P.R.N.

## 2019-01-20 ENCOUNTER — HOSPITAL ENCOUNTER (INPATIENT)
Facility: MEDICAL CENTER | Age: 59
LOS: 4 days | DRG: 918 | End: 2019-01-24
Attending: EMERGENCY MEDICINE | Admitting: HOSPITALIST
Payer: MEDICAID

## 2019-01-20 ENCOUNTER — APPOINTMENT (OUTPATIENT)
Dept: RADIOLOGY | Facility: MEDICAL CENTER | Age: 59
DRG: 918 | End: 2019-01-20
Attending: EMERGENCY MEDICINE
Payer: MEDICAID

## 2019-01-20 DIAGNOSIS — T50.904A DRUG OVERDOSE, UNDETERMINED INTENT, INITIAL ENCOUNTER: ICD-10-CM

## 2019-01-20 DIAGNOSIS — R41.82 MENTAL STATUS, DECREASED: ICD-10-CM

## 2019-01-20 DIAGNOSIS — F41.9 ANXIETY: ICD-10-CM

## 2019-01-20 PROBLEM — T42.4X4A BENZODIAZEPINE OVERDOSE OF UNDETERMINED INTENT: Status: ACTIVE | Noted: 2019-01-20

## 2019-01-20 LAB
ALBUMIN SERPL BCP-MCNC: 4.1 G/DL (ref 3.2–4.9)
ALBUMIN/GLOB SERPL: 1.6 G/DL
ALP SERPL-CCNC: 79 U/L (ref 30–99)
ALT SERPL-CCNC: 48 U/L (ref 2–50)
AMPHET UR QL SCN: NEGATIVE
ANION GAP SERPL CALC-SCNC: 8 MMOL/L (ref 0–11.9)
APAP SERPL-MCNC: <10 UG/ML (ref 10–30)
AST SERPL-CCNC: 23 U/L (ref 12–45)
BARBITURATES UR QL SCN: NEGATIVE
BASOPHILS # BLD AUTO: 0.5 % (ref 0–1.8)
BASOPHILS # BLD: 0.04 K/UL (ref 0–0.12)
BENZODIAZ UR QL SCN: POSITIVE
BILIRUB SERPL-MCNC: 1 MG/DL (ref 0.1–1.5)
BUN SERPL-MCNC: 10 MG/DL (ref 8–22)
BZE UR QL SCN: NEGATIVE
CALCIUM SERPL-MCNC: 9.4 MG/DL (ref 8.5–10.5)
CANNABINOIDS UR QL SCN: NEGATIVE
CHLORIDE SERPL-SCNC: 105 MMOL/L (ref 96–112)
CO2 SERPL-SCNC: 26 MMOL/L (ref 20–33)
CREAT SERPL-MCNC: 0.78 MG/DL (ref 0.5–1.4)
EKG IMPRESSION: NORMAL
EOSINOPHIL # BLD AUTO: 0.22 K/UL (ref 0–0.51)
EOSINOPHIL NFR BLD: 2.8 % (ref 0–6.9)
ERYTHROCYTE [DISTWIDTH] IN BLOOD BY AUTOMATED COUNT: 40.6 FL (ref 35.9–50)
ETHANOL BLD-MCNC: 0.01 G/DL
GLOBULIN SER CALC-MCNC: 2.5 G/DL (ref 1.9–3.5)
GLUCOSE SERPL-MCNC: 127 MG/DL (ref 65–99)
HCT VFR BLD AUTO: 42.9 % (ref 42–52)
HGB BLD-MCNC: 15.1 G/DL (ref 14–18)
IMM GRANULOCYTES # BLD AUTO: 0.03 K/UL (ref 0–0.11)
IMM GRANULOCYTES NFR BLD AUTO: 0.4 % (ref 0–0.9)
LYMPHOCYTES # BLD AUTO: 1.79 K/UL (ref 1–4.8)
LYMPHOCYTES NFR BLD: 23 % (ref 22–41)
MCH RBC QN AUTO: 30.4 PG (ref 27–33)
MCHC RBC AUTO-ENTMCNC: 35.2 G/DL (ref 33.7–35.3)
MCV RBC AUTO: 86.3 FL (ref 81.4–97.8)
METHADONE UR QL SCN: NEGATIVE
MONOCYTES # BLD AUTO: 0.65 K/UL (ref 0–0.85)
MONOCYTES NFR BLD AUTO: 8.3 % (ref 0–13.4)
NEUTROPHILS # BLD AUTO: 5.06 K/UL (ref 1.82–7.42)
NEUTROPHILS NFR BLD: 65 % (ref 44–72)
NRBC # BLD AUTO: 0 K/UL
NRBC BLD-RTO: 0 /100 WBC
OPIATES UR QL SCN: NEGATIVE
OXYCODONE UR QL SCN: NEGATIVE
PCP UR QL SCN: NEGATIVE
PLATELET # BLD AUTO: 250 K/UL (ref 164–446)
PMV BLD AUTO: 9.8 FL (ref 9–12.9)
POTASSIUM SERPL-SCNC: 3.7 MMOL/L (ref 3.6–5.5)
PROPOXYPH UR QL SCN: NEGATIVE
PROT SERPL-MCNC: 6.6 G/DL (ref 6–8.2)
RBC # BLD AUTO: 4.97 M/UL (ref 4.7–6.1)
SALICYLATES SERPL-MCNC: 0 MG/DL (ref 15–25)
SODIUM SERPL-SCNC: 139 MMOL/L (ref 135–145)
WBC # BLD AUTO: 7.8 K/UL (ref 4.8–10.8)

## 2019-01-20 PROCEDURE — A9270 NON-COVERED ITEM OR SERVICE: HCPCS | Performed by: PSYCHIATRY & NEUROLOGY

## 2019-01-20 PROCEDURE — 700102 HCHG RX REV CODE 250 W/ 637 OVERRIDE(OP): Performed by: PSYCHIATRY & NEUROLOGY

## 2019-01-20 PROCEDURE — 700111 HCHG RX REV CODE 636 W/ 250 OVERRIDE (IP): Performed by: HOSPITALIST

## 2019-01-20 PROCEDURE — 80307 DRUG TEST PRSMV CHEM ANLYZR: CPT

## 2019-01-20 PROCEDURE — HZ2ZZZZ DETOXIFICATION SERVICES FOR SUBSTANCE ABUSE TREATMENT: ICD-10-PCS | Performed by: HOSPITALIST

## 2019-01-20 PROCEDURE — 3E02340 INTRODUCTION OF INFLUENZA VACCINE INTO MUSCLE, PERCUTANEOUS APPROACH: ICD-10-PCS | Performed by: HOSPITALIST

## 2019-01-20 PROCEDURE — A9270 NON-COVERED ITEM OR SERVICE: HCPCS | Performed by: HOSPITALIST

## 2019-01-20 PROCEDURE — 93005 ELECTROCARDIOGRAM TRACING: CPT | Performed by: EMERGENCY MEDICINE

## 2019-01-20 PROCEDURE — 770020 HCHG ROOM/CARE - TELE (206)

## 2019-01-20 PROCEDURE — 80053 COMPREHEN METABOLIC PANEL: CPT

## 2019-01-20 PROCEDURE — 71045 X-RAY EXAM CHEST 1 VIEW: CPT

## 2019-01-20 PROCEDURE — 90471 IMMUNIZATION ADMIN: CPT

## 2019-01-20 PROCEDURE — 700102 HCHG RX REV CODE 250 W/ 637 OVERRIDE(OP): Performed by: HOSPITALIST

## 2019-01-20 PROCEDURE — 90686 IIV4 VACC NO PRSV 0.5 ML IM: CPT | Performed by: HOSPITALIST

## 2019-01-20 PROCEDURE — 99285 EMERGENCY DEPT VISIT HI MDM: CPT

## 2019-01-20 PROCEDURE — 99223 1ST HOSP IP/OBS HIGH 75: CPT | Mod: AI | Performed by: HOSPITALIST

## 2019-01-20 PROCEDURE — 85025 COMPLETE CBC W/AUTO DIFF WBC: CPT

## 2019-01-20 RX ORDER — LORAZEPAM 1 MG/1
2 TABLET ORAL
Status: DISCONTINUED | OUTPATIENT
Start: 2019-01-20 | End: 2019-01-21

## 2019-01-20 RX ORDER — PROMETHAZINE HYDROCHLORIDE 25 MG/1
12.5-25 TABLET ORAL EVERY 4 HOURS PRN
Status: DISCONTINUED | OUTPATIENT
Start: 2019-01-20 | End: 2019-01-22

## 2019-01-20 RX ORDER — LORAZEPAM 2 MG/ML
2 INJECTION INTRAMUSCULAR
Status: DISCONTINUED | OUTPATIENT
Start: 2019-01-20 | End: 2019-01-21

## 2019-01-20 RX ORDER — BISACODYL 10 MG
10 SUPPOSITORY, RECTAL RECTAL
Status: DISCONTINUED | OUTPATIENT
Start: 2019-01-20 | End: 2019-01-24 | Stop reason: HOSPADM

## 2019-01-20 RX ORDER — HALOPERIDOL 5 MG/ML
2-5 INJECTION INTRAMUSCULAR EVERY 4 HOURS PRN
Status: DISCONTINUED | OUTPATIENT
Start: 2019-01-20 | End: 2019-01-24 | Stop reason: HOSPADM

## 2019-01-20 RX ORDER — LORAZEPAM 2 MG/ML
1.5 INJECTION INTRAMUSCULAR
Status: DISCONTINUED | OUTPATIENT
Start: 2019-01-20 | End: 2019-01-21

## 2019-01-20 RX ORDER — ONDANSETRON 2 MG/ML
4 INJECTION INTRAMUSCULAR; INTRAVENOUS EVERY 4 HOURS PRN
Status: DISCONTINUED | OUTPATIENT
Start: 2019-01-20 | End: 2019-01-24 | Stop reason: HOSPADM

## 2019-01-20 RX ORDER — AMLODIPINE BESYLATE 10 MG/1
10 TABLET ORAL DAILY
Status: DISCONTINUED | OUTPATIENT
Start: 2019-01-21 | End: 2019-01-24 | Stop reason: HOSPADM

## 2019-01-20 RX ORDER — LORAZEPAM 1 MG/1
1 TABLET ORAL EVERY 4 HOURS PRN
Status: DISCONTINUED | OUTPATIENT
Start: 2019-01-20 | End: 2019-01-21

## 2019-01-20 RX ORDER — PROMETHAZINE HYDROCHLORIDE 25 MG/1
12.5-25 SUPPOSITORY RECTAL EVERY 4 HOURS PRN
Status: DISCONTINUED | OUTPATIENT
Start: 2019-01-20 | End: 2019-01-22

## 2019-01-20 RX ORDER — OLANZAPINE 10 MG/1
10 TABLET ORAL NIGHTLY
Status: ON HOLD | COMMUNITY
End: 2019-01-22

## 2019-01-20 RX ORDER — ATORVASTATIN CALCIUM 40 MG/1
40 TABLET, FILM COATED ORAL
Status: DISCONTINUED | OUTPATIENT
Start: 2019-01-20 | End: 2019-01-24 | Stop reason: HOSPADM

## 2019-01-20 RX ORDER — ONDANSETRON 4 MG/1
4 TABLET, ORALLY DISINTEGRATING ORAL EVERY 4 HOURS PRN
Status: DISCONTINUED | OUTPATIENT
Start: 2019-01-20 | End: 2019-01-24 | Stop reason: HOSPADM

## 2019-01-20 RX ORDER — OLANZAPINE 10 MG/1
10 TABLET ORAL NIGHTLY
Status: DISCONTINUED | OUTPATIENT
Start: 2019-01-20 | End: 2019-01-20

## 2019-01-20 RX ORDER — ACETAMINOPHEN 325 MG/1
650 TABLET ORAL EVERY 6 HOURS PRN
Status: DISCONTINUED | OUTPATIENT
Start: 2019-01-20 | End: 2019-01-24 | Stop reason: HOSPADM

## 2019-01-20 RX ORDER — LORAZEPAM 2 MG/ML
0.5 INJECTION INTRAMUSCULAR EVERY 4 HOURS PRN
Status: DISCONTINUED | OUTPATIENT
Start: 2019-01-20 | End: 2019-01-21

## 2019-01-20 RX ORDER — AMOXICILLIN 250 MG
2 CAPSULE ORAL 2 TIMES DAILY
Status: DISCONTINUED | OUTPATIENT
Start: 2019-01-20 | End: 2019-01-24 | Stop reason: HOSPADM

## 2019-01-20 RX ORDER — DULOXETIN HYDROCHLORIDE 60 MG/1
60 CAPSULE, DELAYED RELEASE ORAL DAILY
Status: DISCONTINUED | OUTPATIENT
Start: 2019-01-21 | End: 2019-01-20

## 2019-01-20 RX ORDER — PROPRANOLOL HYDROCHLORIDE 20 MG/1
40 TABLET ORAL 3 TIMES DAILY
Status: DISCONTINUED | OUTPATIENT
Start: 2019-01-20 | End: 2019-01-21

## 2019-01-20 RX ORDER — LORAZEPAM 2 MG/ML
1 INJECTION INTRAMUSCULAR
Status: DISCONTINUED | OUTPATIENT
Start: 2019-01-20 | End: 2019-01-21

## 2019-01-20 RX ORDER — LORAZEPAM 1 MG/1
4 TABLET ORAL
Status: DISCONTINUED | OUTPATIENT
Start: 2019-01-20 | End: 2019-01-21

## 2019-01-20 RX ORDER — POLYETHYLENE GLYCOL 3350 17 G/17G
1 POWDER, FOR SOLUTION ORAL
Status: DISCONTINUED | OUTPATIENT
Start: 2019-01-20 | End: 2019-01-24 | Stop reason: HOSPADM

## 2019-01-20 RX ORDER — GABAPENTIN 300 MG/1
300 CAPSULE ORAL 4 TIMES DAILY
Status: DISCONTINUED | OUTPATIENT
Start: 2019-01-20 | End: 2019-01-24 | Stop reason: HOSPADM

## 2019-01-20 RX ORDER — DULOXETIN HYDROCHLORIDE 60 MG/1
60 CAPSULE, DELAYED RELEASE ORAL DAILY
COMMUNITY

## 2019-01-20 RX ORDER — LORAZEPAM 0.5 MG/1
0.5 TABLET ORAL EVERY 4 HOURS PRN
Status: DISCONTINUED | OUTPATIENT
Start: 2019-01-20 | End: 2019-01-21

## 2019-01-20 RX ORDER — LORAZEPAM 1 MG/1
3 TABLET ORAL
Status: DISCONTINUED | OUTPATIENT
Start: 2019-01-20 | End: 2019-01-21

## 2019-01-20 RX ADMIN — ATORVASTATIN CALCIUM 40 MG: 40 TABLET, FILM COATED ORAL at 21:36

## 2019-01-20 RX ADMIN — GABAPENTIN 300 MG: 300 CAPSULE ORAL at 21:36

## 2019-01-20 RX ADMIN — INFLUENZA A VIRUS A/MICHIGAN/45/2015 X-275 (H1N1) ANTIGEN (FORMALDEHYDE INACTIVATED), INFLUENZA A VIRUS A/SINGAPORE/INFIMH-16-0019/2016 IVR-186 (H3N2) ANTIGEN (FORMALDEHYDE INACTIVATED), INFLUENZA B VIRUS B/PHUKET/3073/2013 ANTIGEN (FORMALDEHYDE INACTIVATED), AND INFLUENZA B VIRUS B/MARYLAND/15/2016 BX-69A ANTIGEN (FORMALDEHYDE INACTIVATED) 0.5 ML: 15; 15; 15; 15 INJECTION, SUSPENSION INTRAMUSCULAR at 18:24

## 2019-01-20 RX ADMIN — LORAZEPAM 1 MG: 1 TABLET ORAL at 18:23

## 2019-01-20 RX ADMIN — ACETAMINOPHEN 650 MG: 325 TABLET, FILM COATED ORAL at 14:47

## 2019-01-20 RX ADMIN — LORAZEPAM 2 MG: 1 TABLET ORAL at 21:35

## 2019-01-20 RX ADMIN — PROPRANOLOL HYDROCHLORIDE 40 MG: 20 TABLET ORAL at 21:37

## 2019-01-20 RX ADMIN — GABAPENTIN 300 MG: 300 CAPSULE ORAL at 18:23

## 2019-01-20 ASSESSMENT — LIFESTYLE VARIABLES
ANXIETY: MODERATELY ANXIOUS OR GUARDED, SO ANXIETY IS INFERRED
VISUAL DISTURBANCES: MODERATE SENSITIVITY
AUDITORY DISTURBANCES: NOT PRESENT
TOTAL SCORE: 11
SUBSTANCE_ABUSE: 0
ON A TYPICAL DAY WHEN YOU DRINK ALCOHOL HOW MANY DRINKS DO YOU HAVE: 0
PAROXYSMAL SWEATS: *
ORIENTATION AND CLOUDING OF SENSORIUM: ORIENTED AND CAN DO SERIAL ADDITIONS
ORIENTATION AND CLOUDING OF SENSORIUM: ORIENTED AND CAN DO SERIAL ADDITIONS
EVER FELT BAD OR GUILTY ABOUT YOUR DRINKING: NO
HOW MANY TIMES IN THE PAST YEAR HAVE YOU HAD 5 OR MORE DRINKS IN A DAY: 0
TOTAL SCORE: 11
VISUAL DISTURBANCES: NOT PRESENT
AGITATION: MODERATELY FIDGETY AND RESTLESS
HAVE PEOPLE ANNOYED YOU BY CRITICIZING YOUR DRINKING: NO
CONSUMPTION TOTAL: NEGATIVE
TOTAL SCORE: 0
ANXIETY: *
HAVE YOU EVER FELT YOU SHOULD CUT DOWN ON YOUR DRINKING: NO
TREMOR: NO TREMOR
TREMOR: NO TREMOR
TOTAL SCORE: 0
TOTAL SCORE: 0
HEADACHE, FULLNESS IN HEAD: NOT PRESENT
HEADACHE, FULLNESS IN HEAD: NOT PRESENT
EVER HAD A DRINK FIRST THING IN THE MORNING TO STEADY YOUR NERVES TO GET RID OF A HANGOVER: NO
AUDITORY DISTURBANCES: VERY MILD HARSHNESS OR ABILITY TO FRIGHTEN
DO YOU DRINK ALCOHOL: NO
NAUSEA AND VOMITING: NO NAUSEA AND NO VOMITING
EVER_SMOKED: NEVER
NAUSEA AND VOMITING: NO NAUSEA AND NO VOMITING
PAROXYSMAL SWEATS: *
AVERAGE NUMBER OF DAYS PER WEEK YOU HAVE A DRINK CONTAINING ALCOHOL: 0
ALCOHOL_USE: NO
AGITATION: *

## 2019-01-20 ASSESSMENT — PATIENT HEALTH QUESTIONNAIRE - PHQ9
SUM OF ALL RESPONSES TO PHQ QUESTIONS 1-9: 13
9. THOUGHTS THAT YOU WOULD BE BETTER OFF DEAD, OR OF HURTING YOURSELF: MORE THAN HALF THE DAYS
2. FEELING DOWN, DEPRESSED, IRRITABLE, OR HOPELESS: MORE THAN HALF THE DAYS
1. LITTLE INTEREST OR PLEASURE IN DOING THINGS: MORE THAN HALF THE DAYS
8. MOVING OR SPEAKING SO SLOWLY THAT OTHER PEOPLE COULD HAVE NOTICED. OR THE OPPOSITE, BEING SO FIGETY OR RESTLESS THAT YOU HAVE BEEN MOVING AROUND A LOT MORE THAN USUAL: SEVERAL DAYS
3. TROUBLE FALLING OR STAYING ASLEEP OR SLEEPING TOO MUCH: SEVERAL DAYS
SUM OF ALL RESPONSES TO PHQ9 QUESTIONS 1 AND 2: 4
6. FEELING BAD ABOUT YOURSELF - OR THAT YOU ARE A FAILURE OR HAVE LET YOURSELF OR YOUR FAMILY DOWN: MORE THAN HALF THE DAYS
5. POOR APPETITE OR OVEREATING: SEVERAL DAYS
4. FEELING TIRED OR HAVING LITTLE ENERGY: MORE THAN HALF THE DAYS
7. TROUBLE CONCENTRATING ON THINGS, SUCH AS READING THE NEWSPAPER OR WATCHING TELEVISION: NOT AT ALL

## 2019-01-20 ASSESSMENT — COGNITIVE AND FUNCTIONAL STATUS - GENERAL
MOBILITY SCORE: 24
DAILY ACTIVITIY SCORE: 24
SUGGESTED CMS G CODE MODIFIER DAILY ACTIVITY: CH
SUGGESTED CMS G CODE MODIFIER MOBILITY: CH

## 2019-01-20 ASSESSMENT — ENCOUNTER SYMPTOMS
FEVER: 0
SHORTNESS OF BREATH: 0
NERVOUS/ANXIOUS: 1
PALPITATIONS: 0
HALLUCINATIONS: 0
WEIGHT LOSS: 0
INSOMNIA: 1
CHILLS: 0
HEARTBURN: 0
NAUSEA: 0
VOMITING: 0

## 2019-01-20 ASSESSMENT — PAIN SCALES - GENERAL
PAINLEVEL_OUTOF10: 3
PAINLEVEL_OUTOF10: 5

## 2019-01-20 NOTE — PROGRESS NOTES
Pt aaox4. However, pt attempted to ambulate without assistance. Education about falls given. Pt instructed to remain back in bed and push call light for assistance. Pt does not demonstrate evidence of learning. Bed alarm on, strip alarm in place, bed in lowest positions, wheels locked. Upper side rails up.

## 2019-01-20 NOTE — ED NOTES
Poison Control called reports supportive care, watch for respiratory depression    Script filled 1/15/19- (120 tabs) 1mg Clonazepam QID. Pt has 12 pills left    Case #[id number]

## 2019-01-20 NOTE — ED PROVIDER NOTES
"Took ED Provider Note    Scribed for Froy Simmons M.D. by Asuncion Mujica. 1/20/2019, 9:52 AM.    Primary care provider: Alvin Headley M.D.  Means of arrival: EMS  History obtained from: Patient  History limited by: None    CHIEF COMPLAINT  Chief Complaint   Patient presents with   • Drug Overdose     EMS reports pt had (120) 1mg tabs of Clonazepam filled 5 days ago and the pt only has 12 pills left. 88 missing pills.       HPI  Francois Luis is a 58 y.o. male brought in by ambulance with a history of anxiety who presents to the Emergency Department for evaluation of a drug overdose onset this morning. Per EMS, the patient was prescribed 120 tabs of Clonazepam 1 mg filled 5 days ago for 30 days, but was found to have only 12 pills left. Patient reports he was at home when he began to feel anxious, and \"wanted it to go away\", prompting him to call EMS. Patient currently endorses anxiety, but denies suicidal ideation at this time. He states he has not taken anything else. No alleviating or exacerbating factors identified.     REVIEW OF SYSTEMS  Pertinent positives include anxiety. Pertinent negatives include suicidal ideation.  As above, all other systems reviewed and are negative.   See HPI for further details.     PAST MEDICAL HISTORY   has a past medical history of Anxiety; Arthritis; Bipolar affective (HCC); Depression; Disc disorder; Hypertension; Indigestion; Other specified disorder of intestines; and Type II or unspecified type diabetes mellitus without mention of complication, not stated as uncontrolled.    SURGICAL HISTORY   has a past surgical history that includes carpal tunnel release (1980); other orthopedic surgery (1980); cervical disk and fusion anterior (8/4/2009); tonsillectomy (1980); other (1990); knee arthroscopy (2/29/2012); medial meniscectomy (2/29/2012); other surgical procedure (1982); shoulder decompression arthroscopic (8/29/2012); clavicle distal excision (8/29/2012); " and shoulder arthroscopy w/ bicipital tenodesis repair (8/29/2012).    SOCIAL HISTORY  Social History   Substance Use Topics   • Smoking status: Never Smoker   • Smokeless tobacco: Never Used   • Alcohol use No      History   Drug Use No       FAMILY HISTORY  Family History   Problem Relation Age of Onset   • Hypertension Father    • Cancer Father         oral cancer   • Hypertension Sister    • Cancer Unknown         lung cancer   • Diabetes Unknown    • Stroke Unknown        CURRENT MEDICATIONS  No current facility-administered medications on file prior to encounter.      Current Outpatient Prescriptions on File Prior to Encounter   Medication Sig Dispense Refill   • metFORMIN (GLUCOPHAGE) 500 MG Tab TAKE 1 TAB BY MOUTH 2 TIMES A DAY, WITH MEALS. 180 Tab 0   • atorvastatin (LIPITOR) 40 MG Tab TAKE 1 TAB BY MOUTH EVERY BEDTIME. 90 Tab 0   • amLODIPine (NORVASC) 10 MG Tab Take 1 Tab by mouth every day. 90 Tab 3   • propranolol (INDERAL) 40 MG Tab TAKE 1 TAB BY MOUTH 2 TIMES A DAY. STOP METOPROLOL 90 Tab 3   • clonazepam (KLONOPIN) 1 MG Tab Take 1 mg by mouth 4 times a day.         ALLERGIES  Allergies   Allergen Reactions   • Diflunisal      Sweating and weakness   • Lidocaine Nausea   • Lisinopril      DIZZINESS   • Nabumetone Nausea   • Prednisone      Agitation  and Depression       PHYSICAL EXAM  VITAL SIGNS: /69   Pulse 75   Temp 36.8 °C (98.2 °F) (Temporal)   Resp 16   Wt (!) 125.6 kg (277 lb)   BMI 39.75 kg/m²   Vitals reviewed.  Constitutional: Alert, decreased mental status.  HENT: No signs of trauma, Bilateral external ears normal, Nose normal.   Eyes: Pupils are equal and reactive, Conjunctiva normal, Non-icteric.   Neck: Normal range of motion, No tenderness, Supple, No stridor.   Lymphatic: No lymphadenopathy noted.   Cardiovascular: Regular rate and rhythm, no murmurs.   Thorax & Lungs: Normal breath sounds, No respiratory distress, No wheezing, No chest tenderness.   Abdomen: Bowel sounds  normal, Soft, No tenderness, No peritoneal signs, No masses, No pulsatile masses.   Skin: Warm, Dry, No erythema, No rash.   Back: No bony tenderness, No CVA tenderness.   Extremities: Intact distal pulses, No edema, No tenderness, No cyanosis  Musculoskeletal: Good range of motion in all major joints. No tenderness to palpation or major deformities noted.   Neurologic: Alert , Normal motor function, Normal sensory function, No focal deficits noted.   Psychiatric: Affect normal, Judgment normal, Mood normal.     DIAGNOSTIC STUDIES / PROCEDURES    LABS  Labs Reviewed   URINE DRUG SCREEN - Abnormal; Notable for the following:        Result Value    Benzodiazepines Positive (*)     All other components within normal limits   DIAGNOSTIC ALCOHOL - Abnormal; Notable for the following:     Diagnostic Alcohol 0.01 (*)     All other components within normal limits   COMP METABOLIC PANEL - Abnormal; Notable for the following:     Glucose 127 (*)     All other components within normal limits   SALICYLATE - Abnormal; Notable for the following:     Salicylates, Quant. 0 (*)     All other components within normal limits   CBC WITH DIFFERENTIAL   ACETAMINOPHEN   ESTIMATED GFR      All labs reviewed by me.    EKG Interpretation:  Interpreted by me    12 Lead EKG interpreted by me to show:  Normal sinus rhythm  Rate 72  Axis: Normal  Intervals: Normal  Normal T waves  Normal ST segments  My impression of this EKG: Does not indicate ischemia or arrhythmia at this time.    RADIOLOGY  DX-CHEST-PORTABLE (1 VIEW)   Final Result      No acute cardiopulmonary process is seen.        The radiologist's interpretation of all radiological studies have been reviewed by me.    COURSE & MEDICAL DECISION MAKING  Nursing notes, VS, PMSFHx reviewed in chart.  Differential diagnoses include but not limited to: overdose, anxiety, aspiration    9:50 AM - Obtained and reviewed past medical records from 2016 which indicated history of suicidal  ideation.    9:52 AM - Patient seen and examined at bedside. Ordered for EKG, DX chest, urine drug screen, blood alcohol, CBC, CMP, acetaminophen level, and salicylate level to evaluate. Patient will not need to be intubated at this time, but will be followed closely.      10:09 AM - Obtained EKG to evaluate QTC, and it is normal.     11:05 AM - Paged Dr. Melendez (Hospitalist).    11:08 AM - Patient was reevaluated at bedside. Patient is more alert and he continues to protect airway and does not require intubation at this time.     11:21 AM - Patient will be admitted by Dr. Melendez (Hospitalist) on legal hold for further observation. The patient is understanding and agreeable to plan of care.     The patient remains critically ill.  Critical care time = 40 minutes in directly providing and coordinating critical care and extensive data review.  No time overlap and excludes procedures.      DISPOSITION:  Patient will be admitted to Dr. Melendez (Hospitalist) in critical condition.      FINAL IMPRESSION  1. Drug overdose, undetermined intent, initial encounter    2. Anxiety    3. Mental status, decreased         Critical care time = 40 minutes in directly providing and coordinating critical care and extensive data review.  No time overlap and excludes procedures.     Asuncion PHIPPS (Domenico), am scribing for, and in the presence of, Froy Simmons M.D..    Electronically signed by: Asuncion Mujica (Domenico), 1/20/2019    Froy PHIPPS M.D. personally performed the services described in this documentation, as scribed by Asuncion Mujica in my presence, and it is both accurate and complete. C.     The note accurately reflects work and decisions made by me.  Froy Simmons  1/20/2019  1:00 PM

## 2019-01-20 NOTE — ED NOTES
Med rec complete per pt at bedside and pt home pharmacy   Allergies reviewed  No oral ABX within last 30 days

## 2019-01-20 NOTE — PROGRESS NOTES
Report received. Assumed care of pt. Assessment complete. Pt A&Ox4. Pt in no apparent signs of distress. POC discussed. Call light within reach. Bed in low position. All needs are met at this time.    Patient on tele box, bed alarm on, cont pulse ox in place. Security called to search pt belongings, security at bedside    Pt has belongings in safe keepings and medications at pharmacy. Tag numbers charted, receipts in chart.

## 2019-01-20 NOTE — PSYCHIATRY
"    PSYCHIATRIC CONSULTATION NOTE    Reason for Admission: drug overdose  Reason for Consult: Benzo Overdose  Requesting Provider: Dr. Melendez   Supervising Psychiatric Attending: Dr. Gomez  Source of Information: Patient and chart    Legal Status: On hold    Chief Complaint: \"Can't take the anxiety\"    HPI :   Patient is a 58-year-old male with history of anxiety who was brought to the ED this morning after taking too many clonazepam tablets.  Patient was prescribed 120 tablets of clonazepam 5 days ago, was supposed to be a 30-day supply, found to only have 12 pills left, 1 patient went home he was feeling really anxious and \"wanted to go away.\"  This prompted him to call EMS.    With the patient on [facility], confirmed that patient had been prescribed 120 tablets of 1 mg of clonazepam, filled on 15 January.  UDS was positive for benzodiazepines.  Patient had been previously getting 90 tablets of clonazepam monthly.    On interview today, patient does endorse overwhelming anxiety, does report \"I wanted to end it all.\"  Patient reports he started having this managed anxiety, was not sure what to do, was taking about 20-30 tablets/day per his recollection.  When asked why he did this he reported \"love.\"  Describes being in love with someone who did not love him back, never had a relationship with this person, describes odd details about it.  Otherwise does not appear paranoid or having any other delusions.  Does report he is very isolated, does not mind being alone.    On psych ROS, pt denies psychotic symptoms including AH / VH, denies trauma related symptoms, see HPI for depressive symptoms and see HPI for anxeity symptoms    Objective:  Blood pressure 141/90, pulse 79, temperature 36.3 °C (97.3 °F), temperature source Temporal, resp. rate 18, height 1.829 m (6'), weight 121.7 kg (268 lb 4.8 oz), SpO2 95 %.      MSE :   Appearance: 58-year-old male, appears stated age   Behavior: Poor eye contact, does " "appear still though relates having anxiety.   Speech : monotone   Language: normal vocabulary   Mood: \"Anxious\"   Affect: blunted affect   Thought Process: [place] thought process   Thought Content: Patient did have SI, no current SI   Attention/Concentration: Intact   Memory: no gross deficits   Orientation: oriented to person, place, situation   Neurological: Deferred   Fund of Knowledge: appropriate   Insight/Judgment: fair / poor        Past Psych Hx:   Hospitalized about 7 times for SI and depression. Most recently was here at [facility] in 2016.      H/o suicide attempts by overdose requiring medical interventions, h/o cutting (has left long scars on both wrists/forearms).     Past meds:  Wellbutrin (currently on, but stated that it has not been beneficial), Prozac, Lexapro (caused \"hypomania\"), Zoloft, Remeron, Effexor, VPA, Li, Seroquel (caused palpitations and worsened SI), Zyprexa, Risperdal (caused worsened anxiety), Klonopin, Valium, Ritalin.      Pt stated that he had hypomanic symptoms from Lexapro, but no other antidepressants. Stated that he felt \"irritable, restless, discontent,\" denied elevated mood, FOI, decreased need for sleep, or increased talkativeness.      Family Psych Hx:  Brother with bipolar disorder. No attempted or completed suicides.      Social Hx:  Lives alone in house that he inherited.     Continues to be unemployed for the past 4 years    Originally from [place], in [place] for 12 years. No legal hx. Dropped out of  at 10th grade, never completed GED or any college. Was not in special ed, but possibly has learning disability, teacher questioned dyslexia. Obtained GED. Owns \"several\" guns, collects them as a hobby, goes target shooting.      Drugs/Alcohol/Tobacco Hx:  H/o heavy alcohol use, denied h/o withdrawal seizures, but with h/o withdrawal. Went to AA in the past. Last drink was 6 months ago. H/o \"downers, reds,\" last used illicit substances years ago                            "   MEDICAL Hx: labs, MARS, medications, etc were reviewed. Findings of potential interest to psychiatry are noted below:    Past Medical History:   Diagnosis Date   • Anxiety    • Arthritis    • Bipolar affective (HCC)    • Depression    • Disc disorder     Dr. Medina   • Hypertension    • Indigestion    • Other specified disorder of intestines     IBS   • Type II or unspecified type diabetes mellitus without mention of complication, not stated as uncontrolled          Recent Results (from the past 48 hour(s))   Blood Alcohol    Collection Time: 01/20/19 10:00 AM   Result Value Ref Range    Diagnostic Alcohol 0.01 (H) 0.00 g/dL   CBC WITH DIFFERENTIAL    Collection Time: 01/20/19 10:00 AM   Result Value Ref Range    WBC 7.8 4.8 - 10.8 K/uL    RBC 4.97 4.70 - 6.10 M/uL    Hemoglobin 15.1 14.0 - 18.0 g/dL    Hematocrit 42.9 42.0 - 52.0 %    MCV 86.3 81.4 - 97.8 fL    MCH 30.4 27.0 - 33.0 pg    MCHC 35.2 33.7 - 35.3 g/dL    RDW 40.6 35.9 - 50.0 fL    Platelet Count 250 164 - 446 K/uL    MPV 9.8 9.0 - 12.9 fL    Neutrophils-Polys 65.00 44.00 - 72.00 %    Lymphocytes 23.00 22.00 - 41.00 %    Monocytes 8.30 0.00 - 13.40 %    Eosinophils 2.80 0.00 - 6.90 %    Basophils 0.50 0.00 - 1.80 %    Immature Granulocytes 0.40 0.00 - 0.90 %    Nucleated RBC 0.00 /100 WBC    Neutrophils (Absolute) 5.06 1.82 - 7.42 K/uL    Lymphs (Absolute) 1.79 1.00 - 4.80 K/uL    Monos (Absolute) 0.65 0.00 - 0.85 K/uL    Eos (Absolute) 0.22 0.00 - 0.51 K/uL    Baso (Absolute) 0.04 0.00 - 0.12 K/uL    Immature Granulocytes (abs) 0.03 0.00 - 0.11 K/uL    NRBC (Absolute) 0.00 K/uL   COMP METABOLIC PANEL    Collection Time: 01/20/19 10:00 AM   Result Value Ref Range    Sodium 139 135 - 145 mmol/L    Potassium 3.7 3.6 - 5.5 mmol/L    Chloride 105 96 - 112 mmol/L    Co2 26 20 - 33 mmol/L    Anion Gap 8.0 0.0 - 11.9    Glucose 127 (H) 65 - 99 mg/dL    Bun 10 8 - 22 mg/dL    Creatinine 0.78 0.50 - 1.40 mg/dL    Calcium 9.4 8.5 - 10.5 mg/dL    AST(SGOT) 23 12  - 45 U/L    ALT(SGPT) 48 2 - 50 U/L    Alkaline Phosphatase 79 30 - 99 U/L    Total Bilirubin 1.0 0.1 - 1.5 mg/dL    Albumin 4.1 3.2 - 4.9 g/dL    Total Protein 6.6 6.0 - 8.2 g/dL    Globulin 2.5 1.9 - 3.5 g/dL    A-G Ratio 1.6 g/dL   Acetaminophen Level    Collection Time: 19 10:00 AM   Result Value Ref Range    Acetaminophen -Tylenol <10 10 - 30 ug/mL   SALICYLATE LEVEL    Collection Time: 19 10:00 AM   Result Value Ref Range    Salicylates, Quant. 0 (L) 15 - 25 mg/dL   ESTIMATED GFR    Collection Time: 19 10:00 AM   Result Value Ref Range    GFR If African American >60 >60 mL/min/1.73 m 2    GFR If Non African American >60 >60 mL/min/1.73 m 2   EKG    Collection Time: 19 10:03 AM   Result Value Ref Range    Report       [facility] Emergency Dept.    Test Date:  2019  Pt Name:    MARIELOS FISHER                Department: ER  MRN:        [id number]                      Room:       [facility]  Gender:     Male                         Technician: [id number]  :        1960                   Requested By:SHAGGY RM  Order #:    [id number]                    Reading MD: SHAGGY RM MD    Measurements  Intervals                                Axis  Rate:       72                           P:          30  IA:         196                          QRS:        1  QRSD:       100                          T:          11  QT:         396  QTc:        434    Interpretive Statements  SINUS RHYTHM  BORDERLINE LOW VOLTAGE IN FRONTAL LEADS  Compared to ECG 2012 10:00:30  No significant changes    Electronically Signed On 2019 13:38:13 PST by SHAGGY RM MD     URINE DRUG SCREEN (TRIAGE)    Collection Time: 19 10:30 AM   Result Value Ref Range    Amphetamines Urine Negative Negative    Barbiturates Negative Negative    Benzodiazepines Positive (A) Negative    Cocaine Metabolite Negative Negative    Methadone Negative Negative    Opiates Negative Negative     Oxycodone Negative Negative    Phencyclidine -Pcp Negative Negative    Propoxyphene Negative Negative    Cannabinoid Metab Negative Negative         Medications:   Current Facility-Administered Medications   Medication Dose Route Frequency Provider Last Rate Last Dose   • senna-docusate (PERICOLACE or SENOKOT S) 8.6-50 MG per tablet 2 Tab  2 Tab Oral BID Nigel Melendez M.D.        And   • polyethylene glycol/lytes (MIRALAX) PACKET 1 Packet  1 Packet Oral QDAY PRN Nigel Melendez M.D.        And   • magnesium hydroxide (MILK OF MAGNESIA) suspension 30 mL  30 mL Oral QDAY PRN Nigel Melendez M.D.        And   • bisacodyl (DULCOLAX) suppository 10 mg  10 mg Rectal QDAY PRN Nigel Melendez M.D.       • Respiratory Care per Protocol   Nebulization Continuous RT Nigel Melendez M.D.       • ondansetron (ZOFRAN) syringe/vial injection 4 mg  4 mg Intravenous Q4HRS PRN Nigel Melendez M.D.       • ondansetron (ZOFRAN ODT) dispertab 4 mg  4 mg Oral Q4HRS PRN Nigel Melendez M.D.       • promethazine (PHENERGAN) tablet 12.5-25 mg  12.5-25 mg Oral Q4HRS PRN Nigel Melendez M.D.       • promethazine (PHENERGAN) suppository 12.5-25 mg  12.5-25 mg Rectal Q4HRS PRN Nigel Melendez M.D.       • prochlorperazine (COMPAZINE) injection 5-10 mg  5-10 mg Intravenous Q4HRS PRN Nigel Melendez M.D.       • [START ON 1/21/2019] enoxaparin (LOVENOX) inj 40 mg  40 mg Subcutaneous DAILY Nigel Melendez M.D.       • acetaminophen (TYLENOL) tablet 650 mg  650 mg Oral Q6HRS PRN Nigel Melendez M.D.       • Influenza Vaccine Quad pf injection 0.5 mL  0.5 mL Intramuscular Once Nigel Melendez M.D.           Allergies: Diflunisal; Lidocaine; Lisinopril; Nabumetone; and Prednisone    Imaging: No recent head, neck, or brain imaging    EKG: QTC of 434    Medical Review of Systems:   Review of Systems   Constitutional: Negative for chills, fever and weight loss.   Cardiovascular: Negative for chest pain and palpitations.   Gastrointestinal: Negative for  heartburn, nausea and vomiting.   Psychiatric/Behavioral:        See hpi   All other systems negative         Assessment:  Patient is a 58-year-old male with an overdose on Klonopin due to severe anxiety and panic attacks.  Patient has built up a high tolerance of this medication, just recently got tablets for the month but went through them in 5 days.  Currently having some anxiety symptoms, no history of seizures when coming off medications.    Due to risk of benzodiazepine withdrawal and the high amount he was on, will place patient on a CIWA protocol.    DDX:  #Unspecified anxiety disorder  (rule out panic disorder vs AMENA with panic)  Rule out intellectual limitations  Rule out Schizoid personality disorder    Plan:  - Watch for benzodiazepine withdrawal, including shaking, seizures, vital instability, psychosis or altered mental status.    - Start Gabapentin 300 mg po qid x 3 days anxiety and prevent complications of withdrawal. Then may consider  tapering pt down or off gabapentin unless it is helping considerably with anxiety.    - Start CIWA protocol, due to risk of withdrawal, withdrawal may come at a later time as pt was on long acting Benzo  - Hold off on Cymbalta and Zyprexa  for now due to overdose, may consider restarting or changing soon  - consider restarting an antipsychotic if he becomes psychotic  - QTC looks good so far    Legal Hold Precautions:  Patient needs direct obeservation, Danger for harm to self, Patient may not make phone calls, Patient may not have visitors, Patient may not have personal items or clothing and Patient may have call light    Medical: as noted under Medical Hx and by Medical Team     Disposition:     Pt needs further psychiatric treatment and should be transferred to an acute psychiatric hospital when medically cleared   We will continue to follow up with the pt   Legal hold extended (order placed to )     Thank you for the consult.       Discussed assessment and plan  with the attending psychiatrist                 This note was created using voice recognition software (Dragon). The accuracy of the dictation is limited by the abilities of the software. I have reviewed the note prior to signing, however some errors in grammar and context are still possible. If you have any questions related to this note please do not hesitate to contact our office.

## 2019-01-20 NOTE — PROGRESS NOTES
Pt complain of anxiety and agitation. Psych in to see pt and stated will look through the chart and see what can be ordered. Instructed to make dr. Melendez aware for any changes

## 2019-01-20 NOTE — ED NOTES
Pt resting in bed, breathing equal/unlabored. No distress noted. Direct observation from RN. Will continue to monitor.

## 2019-01-20 NOTE — PROGRESS NOTES
"Pt presents with left great toe bruise and pain at base. 2nd toe appears red. Pt states prior to admission he \"stubbed\" it. Pt given tylenol as ordered, will continue to monitor.   "

## 2019-01-21 LAB
ALBUMIN SERPL BCP-MCNC: 3.7 G/DL (ref 3.2–4.9)
ALBUMIN/GLOB SERPL: 1.8 G/DL
ALP SERPL-CCNC: 68 U/L (ref 30–99)
ALT SERPL-CCNC: 41 U/L (ref 2–50)
ANION GAP SERPL CALC-SCNC: 10 MMOL/L (ref 0–11.9)
AST SERPL-CCNC: 19 U/L (ref 12–45)
BILIRUB SERPL-MCNC: 1 MG/DL (ref 0.1–1.5)
BUN SERPL-MCNC: 11 MG/DL (ref 8–22)
CALCIUM SERPL-MCNC: 9 MG/DL (ref 8.5–10.5)
CHLORIDE SERPL-SCNC: 107 MMOL/L (ref 96–112)
CO2 SERPL-SCNC: 25 MMOL/L (ref 20–33)
CREAT SERPL-MCNC: 0.68 MG/DL (ref 0.5–1.4)
GLOBULIN SER CALC-MCNC: 2.1 G/DL (ref 1.9–3.5)
GLUCOSE SERPL-MCNC: 114 MG/DL (ref 65–99)
POTASSIUM SERPL-SCNC: 3.6 MMOL/L (ref 3.6–5.5)
PROT SERPL-MCNC: 5.8 G/DL (ref 6–8.2)
SODIUM SERPL-SCNC: 142 MMOL/L (ref 135–145)

## 2019-01-21 PROCEDURE — 99233 SBSQ HOSP IP/OBS HIGH 50: CPT | Performed by: HOSPITALIST

## 2019-01-21 PROCEDURE — 36415 COLL VENOUS BLD VENIPUNCTURE: CPT

## 2019-01-21 PROCEDURE — 770001 HCHG ROOM/CARE - MED/SURG/GYN PRIV*

## 2019-01-21 PROCEDURE — A9270 NON-COVERED ITEM OR SERVICE: HCPCS | Performed by: PSYCHIATRY & NEUROLOGY

## 2019-01-21 PROCEDURE — 700102 HCHG RX REV CODE 250 W/ 637 OVERRIDE(OP): Performed by: HOSPITALIST

## 2019-01-21 PROCEDURE — 700111 HCHG RX REV CODE 636 W/ 250 OVERRIDE (IP): Performed by: HOSPITALIST

## 2019-01-21 PROCEDURE — A9270 NON-COVERED ITEM OR SERVICE: HCPCS | Performed by: HOSPITALIST

## 2019-01-21 PROCEDURE — 700102 HCHG RX REV CODE 250 W/ 637 OVERRIDE(OP): Performed by: PSYCHIATRY & NEUROLOGY

## 2019-01-21 PROCEDURE — 99233 SBSQ HOSP IP/OBS HIGH 50: CPT | Performed by: PSYCHIATRY & NEUROLOGY

## 2019-01-21 PROCEDURE — 80053 COMPREHEN METABOLIC PANEL: CPT

## 2019-01-21 RX ORDER — PROPRANOLOL HYDROCHLORIDE 20 MG/1
20 TABLET ORAL 3 TIMES DAILY
Status: DISCONTINUED | OUTPATIENT
Start: 2019-01-22 | End: 2019-01-24 | Stop reason: HOSPADM

## 2019-01-21 RX ORDER — ARIPIPRAZOLE 10 MG/1
5 TABLET ORAL DAILY
Status: DISCONTINUED | OUTPATIENT
Start: 2019-01-21 | End: 2019-01-24 | Stop reason: HOSPADM

## 2019-01-21 RX ORDER — DULOXETIN HYDROCHLORIDE 30 MG/1
30 CAPSULE, DELAYED RELEASE ORAL DAILY
Status: DISCONTINUED | OUTPATIENT
Start: 2019-01-22 | End: 2019-01-24 | Stop reason: HOSPADM

## 2019-01-21 RX ORDER — CLONAZEPAM 1 MG/1
1 TABLET ORAL 3 TIMES DAILY
Status: DISCONTINUED | OUTPATIENT
Start: 2019-01-21 | End: 2019-01-21

## 2019-01-21 RX ORDER — CLONAZEPAM 1 MG/1
1 TABLET ORAL 2 TIMES DAILY
Status: DISCONTINUED | OUTPATIENT
Start: 2019-01-22 | End: 2019-01-22

## 2019-01-21 RX ADMIN — ARIPIPRAZOLE 5 MG: 10 TABLET ORAL at 14:33

## 2019-01-21 RX ADMIN — CLONAZEPAM 1 MG: 1 TABLET ORAL at 12:24

## 2019-01-21 RX ADMIN — SENNOSIDES AND DOCUSATE SODIUM 2 TABLET: 8.6; 5 TABLET ORAL at 17:00

## 2019-01-21 RX ADMIN — GABAPENTIN 300 MG: 300 CAPSULE ORAL at 12:24

## 2019-01-21 RX ADMIN — GABAPENTIN 300 MG: 300 CAPSULE ORAL at 22:40

## 2019-01-21 RX ADMIN — METFORMIN HYDROCHLORIDE 500 MG: 500 TABLET ORAL at 08:10

## 2019-01-21 RX ADMIN — METFORMIN HYDROCHLORIDE 500 MG: 500 TABLET ORAL at 16:59

## 2019-01-21 RX ADMIN — ENOXAPARIN SODIUM 40 MG: 100 INJECTION SUBCUTANEOUS at 05:25

## 2019-01-21 RX ADMIN — GABAPENTIN 300 MG: 300 CAPSULE ORAL at 08:10

## 2019-01-21 RX ADMIN — GABAPENTIN 300 MG: 300 CAPSULE ORAL at 17:00

## 2019-01-21 RX ADMIN — ATORVASTATIN CALCIUM 40 MG: 40 TABLET, FILM COATED ORAL at 22:40

## 2019-01-21 ASSESSMENT — LIFESTYLE VARIABLES
NAUSEA AND VOMITING: NO NAUSEA AND NO VOMITING
ANXIETY: MILDLY ANXIOUS
TOTAL SCORE: 1
VISUAL DISTURBANCES: NOT PRESENT
ANXIETY: NO ANXIETY (AT EASE)
ANXIETY: NO ANXIETY (AT EASE)
TREMOR: NO TREMOR
PAROXYSMAL SWEATS: *
AGITATION: NORMAL ACTIVITY
HEADACHE, FULLNESS IN HEAD: NOT PRESENT
NAUSEA AND VOMITING: NO NAUSEA AND NO VOMITING
VISUAL DISTURBANCES: NOT PRESENT
ANXIETY: NO ANXIETY (AT EASE)
AGITATION: NORMAL ACTIVITY
ANXIETY: NO ANXIETY (AT EASE)
VISUAL DISTURBANCES: NOT PRESENT
AGITATION: NORMAL ACTIVITY
VISUAL DISTURBANCES: NOT PRESENT
TREMOR: NO TREMOR
TREMOR: NO TREMOR
TOTAL SCORE: 3
NAUSEA AND VOMITING: NO NAUSEA AND NO VOMITING
TREMOR: NO TREMOR
HEADACHE, FULLNESS IN HEAD: NOT PRESENT
AUDITORY DISTURBANCES: NOT PRESENT
TOTAL SCORE: 3
VISUAL DISTURBANCES: NOT PRESENT
ORIENTATION AND CLOUDING OF SENSORIUM: ORIENTED AND CAN DO SERIAL ADDITIONS
AGITATION: NORMAL ACTIVITY
TREMOR: NO TREMOR
NAUSEA AND VOMITING: NO NAUSEA AND NO VOMITING
AUDITORY DISTURBANCES: NOT PRESENT
TOTAL SCORE: 1
PAROXYSMAL SWEATS: NO SWEAT VISIBLE
ORIENTATION AND CLOUDING OF SENSORIUM: ORIENTED AND CAN DO SERIAL ADDITIONS
PAROXYSMAL SWEATS: BARELY PERCEPTIBLE SWEATING. PALMS MOIST
TOTAL SCORE: 3
ORIENTATION AND CLOUDING OF SENSORIUM: ORIENTED AND CAN DO SERIAL ADDITIONS
ORIENTATION AND CLOUDING OF SENSORIUM: ORIENTED AND CAN DO SERIAL ADDITIONS
AGITATION: NORMAL ACTIVITY
HEADACHE, FULLNESS IN HEAD: NOT PRESENT
AUDITORY DISTURBANCES: NOT PRESENT
ORIENTATION AND CLOUDING OF SENSORIUM: ORIENTED AND CAN DO SERIAL ADDITIONS
PAROXYSMAL SWEATS: *
HEADACHE, FULLNESS IN HEAD: NOT PRESENT
PAROXYSMAL SWEATS: *
AUDITORY DISTURBANCES: NOT PRESENT
AUDITORY DISTURBANCES: NOT PRESENT
HEADACHE, FULLNESS IN HEAD: NOT PRESENT
NAUSEA AND VOMITING: NO NAUSEA AND NO VOMITING

## 2019-01-21 ASSESSMENT — PAIN SCALES - GENERAL
PAINLEVEL_OUTOF10: 0
PAINLEVEL_OUTOF10: 2

## 2019-01-21 ASSESSMENT — ENCOUNTER SYMPTOMS
SHORTNESS OF BREATH: 0
CHILLS: 0
FEVER: 0

## 2019-01-21 NOTE — PROGRESS NOTES
Bedside report received from Ricky CHRISTIANSON and Anni CHRISTIANSON, assumed care of pt. Pt resting in bed, watching TV. Pt denies pain at this time and verbalizes no additional needs. Discussed POC with pt, updated board. Tele box in place, bed alarm on, safety precautions in place. Will continue to monitor.

## 2019-01-21 NOTE — CARE PLAN
Problem: Safety  Goal: Will remain free from falls  Outcome: PROGRESSING SLOWER THAN EXPECTED    Intervention: Assess risk factors for falls   01/20/19 2300   OTHER   Mobility Status Assessment 1-1 Healthcare Provider Required for Assistance with Ambulation & Transfer   History of fall 0   Pt Calls for Assistance No     Intervention: Implement fall precautions   01/20/19 2300   OTHER   Environmental Precautions Treaded Slipper Socks on Patient;Personal Belongings, Wastebasket, Call Bell etc. in Easy Reach;Transferred to Stronger Side;Report Given to Other Health Care Providers Regarding Fall Risk;Bed in Low Position;Communication Sign for Patients & Families;Mobility Assessed & Appropriate Sign Placed   Chair/Bed Strip Alarm Yes - Alarm On   Bed Alarm Yes - Alarm On   Pt at risk for falls due to benzo withdrawal and CIWA protocol. Pt does not use call light regularly and can be impulsive about getting out of bed. Lap belt implemented, pt was able to demonstrate how to take it off effectively.       Problem: Bowel/Gastric:  Goal: Normal bowel function is maintained or improved  Outcome: PROGRESSING AS EXPECTED   01/20/19 1400   OTHER   Last BM 01/20/19   Pt having regular BMs. Pt has normoactive bowel sounds in all 4 quadrants.

## 2019-01-21 NOTE — PROGRESS NOTES
Pt impulsively gets out of bed without utilizing call light. Pt reoriented to call light several times. Lap belt placed on pt. Pt is able to demonstrate how to remove the lap belt. Movie turned on for pt at pts request. Bed alarm and strip alarm on. Bed in low/locked position. Upper bed rails up.

## 2019-01-21 NOTE — H&P
Hospital Medicine History & Physical Note    Date of Service  1/20/2019    Primary Care Physician  Alvin Headley M.D.    Consultants  Psychiatry    Code Status  Full     Chief Complaint  anxiety    History of Presenting Illness  58 y.o. male who presented 1/20/2019 with severe, intractable, and escalating anxiety.  Mr. Luis has a past medical history of diabetes and hypertension as well as bipolar disorder that has been receiving prescriptions for clonazepam from his psychiatrist.  5 days ago he was prescribed 120 pill count bottle of 1 mg clonazepam's.  Today he called 911 as he is taking 108 of these pills already and states he continues to feel anxious.  In the emergency room, he is awake and alert and oriented x4 though a markedly flat affect and is able to understand that he is being placed on a legal hold.  He is very high risk of withdrawal will be monitored on telemetry with continuous pulse oximetry.  Psychiatry has been consulted.  Mr. Luis is requesting more benzodiazepines in the emergency room because we discussed that he will not receive any further benzodiazepines through the hospitalist service will be the discretion of psychiatry.    Review of Systems  Review of Systems   Constitutional: Negative for chills and fever.   Respiratory: Negative for shortness of breath.    Cardiovascular: Negative for chest pain.   Psychiatric/Behavioral: Negative for hallucinations and substance abuse. The patient is nervous/anxious and has insomnia.    All other systems reviewed and are negative.      Past Medical History   has a past medical history of Anxiety; Arthritis; Bipolar affective (HCC); Depression; Disc disorder; Hypertension; Indigestion; Other specified disorder of intestines; and Type II or unspecified type diabetes mellitus without mention of complication, not stated as uncontrolled.    Surgical History   has a past surgical history that includes carpal tunnel release (1980); other  orthopedic surgery (1980); cervical disk and fusion anterior (8/4/2009); tonsillectomy (1980); other (1990); knee arthroscopy (2/29/2012); medial meniscectomy (2/29/2012); other surgical procedure (1982); shoulder decompression arthroscopic (8/29/2012); clavicle distal excision (8/29/2012); and shoulder arthroscopy w/ bicipital tenodesis repair (8/29/2012).     Family History  family history includes Cancer in his father and unknown relative; Diabetes in his unknown relative; Hypertension in his father and sister; Stroke in his unknown relative.  No family history of psychiatric disease    Social History   reports that he has never smoked. He has never used smokeless tobacco. He reports that he does not drink alcohol or use drugs.  He lives alone    Allergies  Allergies   Allergen Reactions   • Diflunisal      Sweating and weakness   • Lidocaine Nausea   • Lisinopril      DIZZINESS   • Nabumetone Nausea   • Prednisone      Agitation  and Depression       Medications  Prior to Admission Medications   Prescriptions Last Dose Informant Patient Reported? Taking?   DULoxetine (CYMBALTA) 60 MG Cap DR Particles delayed-release capsule 1/20/2019 at am Patient's Home Pharmacy Yes Yes   Sig: Take 60 mg by mouth every day.   amLODIPine (NORVASC) 10 MG Tab 1/20/2019 at am Patient's Home Pharmacy No No   Sig: Take 1 Tab by mouth every day.   atorvastatin (LIPITOR) 40 MG Tab 1/19/2019 at pm Patient's Home Pharmacy No No   Sig: TAKE 1 TAB BY MOUTH EVERY BEDTIME.   clonazepam (KLONOPIN) 1 MG Tab 1/20/2019 at am Patient's Home Pharmacy Yes No   Sig: Take 1 mg by mouth 4 times a day.   metFORMIN (GLUCOPHAGE) 500 MG Tab 1/20/2019 at am Patient's Home Pharmacy No No   Sig: TAKE 1 TAB BY MOUTH 2 TIMES A DAY, WITH MEALS.   olanzapine (ZYPREXA) 10 MG tablet 1/19/2019 at pm Patient's Home Pharmacy Yes Yes   Sig: Take 10 mg by mouth every evening.   propranolol (INDERAL) 40 MG Tab 1/20/2019 at am Patient's Home Pharmacy No No   Sig: TAKE 1  TAB BY MOUTH 2 TIMES A DAY. STOP METOPROLOL      Facility-Administered Medications: None       Physical Exam  Temp:  [36.3 °C (97.3 °F)-36.8 °C (98.2 °F)] 36.4 °C (97.6 °F)  Pulse:  [68-79] 77  Resp:  [15-18] 15  BP: (138-141)/(69-95) 138/95  SpO2:  [94 %-97 %] 97 %    Physical Exam   Constitutional: He is oriented to person, place, and time. No distress.   HENT:   Head: Normocephalic and atraumatic.   Dry mucous membrane   Neck: Neck supple.   Cardiovascular: Normal rate and regular rhythm.    No murmur heard.  Pulmonary/Chest: Effort normal. No respiratory distress. He has no wheezes.   Abdominal: Soft. He exhibits no distension. There is no tenderness.   Neurological: He is alert and oriented to person, place, and time.   Skin: He is not diaphoretic.   Psychiatric:   Very flat affect  He is cooperative with exam   Nursing note and vitals reviewed.      Laboratory:  Recent Labs      01/20/19   1000   WBC  7.8   RBC  4.97   HEMOGLOBIN  15.1   HEMATOCRIT  42.9   MCV  86.3   MCH  30.4   MCHC  35.2   RDW  40.6   PLATELETCT  250   MPV  9.8     Recent Labs      01/20/19   1000   SODIUM  139   POTASSIUM  3.7   CHLORIDE  105   CO2  26   GLUCOSE  127*   BUN  10   CREATININE  0.78   CALCIUM  9.4     Recent Labs      01/20/19   1000   ALTSGPT  48   ASTSGOT  23   ALKPHOSPHAT  79   TBILIRUBIN  1.0   GLUCOSE  127*                 No results for input(s): TROPONINI in the last 72 hours.    Urinalysis:    No results found     Imaging:  DX-CHEST-PORTABLE (1 VIEW)   Final Result      No acute cardiopulmonary process is seen.            Assessment/Plan:  I anticipate this patient will require at least two midnights for appropriate medical management, necessitating inpatient admission.    * Benzodiazepine overdose of undetermined intent- (present on admission)   Assessment & Plan    Patient has taken 108 pills of clonazepam 1 mg in the past 5 days  He initially denied suicidal intent though has a history of suicidal ideation in the  past and his actions are most consistent with self-harm therefore a legal hold is been initiated in the emergency room and psychiatry has been consulted  He is at high risk of benzodiazepine withdrawal which can be severe he required some degree of benzodiazepines at the discretion of psychiatry  IV Haldol has been ordered in case he develops significant agitation  He may require transfer to the intensive care unit if his withdrawal symptoms become too severe     Type 2 diabetes mellitus without complication (HCC)- (present on admission)   Assessment & Plan    Continue metformin     Hypertension- (present on admission)   Assessment & Plan    Norvasc 10 mg daily with holding parameters as well as propranolol 40 mg 3 times a day also with holding parameters     Bipolar disorder (HCC)- (present on admission)   Assessment & Plan    Continue with Zyprexa and Cymbalta         VTE prophylaxis: lovenox

## 2019-01-21 NOTE — PROGRESS NOTES
Verified call light order with Dr. Zurita. Nursing communication in place, orders received in person.

## 2019-01-21 NOTE — DISCHARGE PLANNING
Anticipated Discharge Disposition: Inpatient psych facility.    Action: Legal Hold paperwork shows medical clearance noted today by MD. [facility] notified to start referral process.    Barriers to Discharge: Accepting inpatient psych facility.    Plan: RN CM to notify team when accepting facility obtained.

## 2019-01-21 NOTE — CARE PLAN
Problem: Communication  Goal: The ability to communicate needs accurately and effectively will improve  Outcome: PROGRESSING AS EXPECTED  Discussed plan of care, reviewed meds with pt, encouraged pt to voice any questions and/or concerns regarding care.       Problem: Safety  Goal: Will remain free from falls  Outcome: PROGRESSING AS EXPECTED  Assessed fall risk, fall precautions initiated. No slip socks on, bed lowest position. All needs attended to. Patient verbalized understanding.

## 2019-01-21 NOTE — PROGRESS NOTES
Pt alert and oriented x3, continues to get out of bed without assistance. Education provided. Lap belt in place, pt able to demonstrate removal of belt. Will monitor closely.

## 2019-01-21 NOTE — ASSESSMENT & PLAN NOTE
Patient on legal hold, extended  Evaluated by psychiatry  Continue Klonopin home dose to avoid benzodiazepine withdrawal  Anticipate placement inpatient psychiatric rehab for benzodiazepine detoxification and monitoring of suicidal concerns  Appreciate psychiatry input

## 2019-01-21 NOTE — ASSESSMENT & PLAN NOTE
Continue current regimen  Further titration following discharge at inpatient psychiatric rehab hospital under guidance of psychiatrist

## 2019-01-21 NOTE — PROGRESS NOTES
Received report from night shift RNYeyo. Discussed plan of care, updated white board. Pt is resting in bed, alert and oriented x3. Disoriented to time. Reorienting provided. Pt has complaints of SOB, provided 2L via NC sating at 90%. Fall precautions in place. All needs met. Bed in lowest position. Legal hold paperwork verified in pt's chart.. Will continue to monitor.

## 2019-01-21 NOTE — PROGRESS NOTES
2 RN skin check: Skin intact. L great toe bruising at base. Able to move. 2nd toe laceration. Bandaid applied. Pt c/o pain in great toe-Left. Medicated per mar.  Heels dry.

## 2019-01-22 PROCEDURE — A9270 NON-COVERED ITEM OR SERVICE: HCPCS | Performed by: HOSPITALIST

## 2019-01-22 PROCEDURE — 700102 HCHG RX REV CODE 250 W/ 637 OVERRIDE(OP): Performed by: INTERNAL MEDICINE

## 2019-01-22 PROCEDURE — 700102 HCHG RX REV CODE 250 W/ 637 OVERRIDE(OP): Performed by: PSYCHIATRY & NEUROLOGY

## 2019-01-22 PROCEDURE — A9270 NON-COVERED ITEM OR SERVICE: HCPCS | Performed by: INTERNAL MEDICINE

## 2019-01-22 PROCEDURE — 770001 HCHG ROOM/CARE - MED/SURG/GYN PRIV*

## 2019-01-22 PROCEDURE — 99232 SBSQ HOSP IP/OBS MODERATE 35: CPT | Performed by: INTERNAL MEDICINE

## 2019-01-22 PROCEDURE — 700111 HCHG RX REV CODE 636 W/ 250 OVERRIDE (IP): Performed by: HOSPITALIST

## 2019-01-22 PROCEDURE — A9270 NON-COVERED ITEM OR SERVICE: HCPCS | Performed by: PSYCHIATRY & NEUROLOGY

## 2019-01-22 PROCEDURE — 700102 HCHG RX REV CODE 250 W/ 637 OVERRIDE(OP): Performed by: HOSPITALIST

## 2019-01-22 RX ORDER — ARIPIPRAZOLE 5 MG/1
5 TABLET ORAL DAILY
Qty: 30 TAB
Start: 2019-01-23 | End: 2019-11-04

## 2019-01-22 RX ORDER — CLONAZEPAM 1 MG/1
1 TABLET ORAL 4 TIMES DAILY
Status: DISCONTINUED | OUTPATIENT
Start: 2019-01-22 | End: 2019-01-24 | Stop reason: HOSPADM

## 2019-01-22 RX ORDER — GABAPENTIN 300 MG/1
300 CAPSULE ORAL 4 TIMES DAILY
Qty: 90 CAP
Start: 2019-01-22 | End: 2019-11-04

## 2019-01-22 RX ORDER — CLONAZEPAM 1 MG/1
1 TABLET ORAL 3 TIMES DAILY
Status: DISCONTINUED | OUTPATIENT
Start: 2019-01-22 | End: 2019-01-22

## 2019-01-22 RX ADMIN — GABAPENTIN 300 MG: 300 CAPSULE ORAL at 20:37

## 2019-01-22 RX ADMIN — CLONAZEPAM 1 MG: 1 TABLET ORAL at 12:31

## 2019-01-22 RX ADMIN — PROPRANOLOL HYDROCHLORIDE 20 MG: 20 TABLET ORAL at 12:31

## 2019-01-22 RX ADMIN — METFORMIN HYDROCHLORIDE 500 MG: 500 TABLET ORAL at 08:59

## 2019-01-22 RX ADMIN — GABAPENTIN 300 MG: 300 CAPSULE ORAL at 17:16

## 2019-01-22 RX ADMIN — CLONAZEPAM 1 MG: 1 TABLET ORAL at 09:00

## 2019-01-22 RX ADMIN — CLONAZEPAM 1 MG: 1 TABLET ORAL at 20:37

## 2019-01-22 RX ADMIN — CLONAZEPAM 1 MG: 1 TABLET ORAL at 17:16

## 2019-01-22 RX ADMIN — CLONAZEPAM 1 MG: 1 TABLET ORAL at 05:59

## 2019-01-22 RX ADMIN — ATORVASTATIN CALCIUM 40 MG: 40 TABLET, FILM COATED ORAL at 20:37

## 2019-01-22 RX ADMIN — DULOXETINE HYDROCHLORIDE 30 MG: 30 CAPSULE, DELAYED RELEASE ORAL at 05:58

## 2019-01-22 RX ADMIN — GABAPENTIN 300 MG: 300 CAPSULE ORAL at 12:31

## 2019-01-22 RX ADMIN — GABAPENTIN 300 MG: 300 CAPSULE ORAL at 08:59

## 2019-01-22 RX ADMIN — PROPRANOLOL HYDROCHLORIDE 20 MG: 20 TABLET ORAL at 05:59

## 2019-01-22 RX ADMIN — ARIPIPRAZOLE 5 MG: 10 TABLET ORAL at 05:59

## 2019-01-22 RX ADMIN — METFORMIN HYDROCHLORIDE 500 MG: 500 TABLET ORAL at 17:16

## 2019-01-22 RX ADMIN — ENOXAPARIN SODIUM 40 MG: 100 INJECTION SUBCUTANEOUS at 05:59

## 2019-01-22 RX ADMIN — AMLODIPINE BESYLATE 10 MG: 10 TABLET ORAL at 05:59

## 2019-01-22 RX ADMIN — SENNOSIDES AND DOCUSATE SODIUM 2 TABLET: 8.6; 5 TABLET ORAL at 17:16

## 2019-01-22 RX ADMIN — SENNOSIDES AND DOCUSATE SODIUM 2 TABLET: 8.6; 5 TABLET ORAL at 05:59

## 2019-01-22 ASSESSMENT — ENCOUNTER SYMPTOMS
SPUTUM PRODUCTION: 0
DIAPHORESIS: 0
COUGH: 0
SHORTNESS OF BREATH: 0
SENSORY CHANGE: 0
HEADACHES: 0
DEPRESSION: 1
FOCAL WEAKNESS: 0
CHILLS: 0
ABDOMINAL PAIN: 0
BACK PAIN: 0
PND: 0
MEMORY LOSS: 0
SEIZURES: 0
DIARRHEA: 0
DIZZINESS: 0
HALLUCINATIONS: 0
WHEEZING: 0
ORTHOPNEA: 0
DOUBLE VISION: 0
LOSS OF CONSCIOUSNESS: 0
NERVOUS/ANXIOUS: 1
SPEECH CHANGE: 0
WEAKNESS: 1
NECK PAIN: 0
PALPITATIONS: 0
TREMORS: 0
MYALGIAS: 0
WEIGHT LOSS: 0
HEMOPTYSIS: 0
NAUSEA: 0
HEARTBURN: 0
BLOOD IN STOOL: 0
VOMITING: 0
FALLS: 0
INSOMNIA: 1
CONSTIPATION: 0
FLANK PAIN: 0
TINGLING: 0
CLAUDICATION: 0
BLURRED VISION: 0
FEVER: 0

## 2019-01-22 ASSESSMENT — LIFESTYLE VARIABLES
ANXIETY: MILDLY ANXIOUS
ORIENTATION AND CLOUDING OF SENSORIUM: ORIENTED AND CAN DO SERIAL ADDITIONS
VISUAL DISTURBANCES: NOT PRESENT
SUBSTANCE_ABUSE: 0
TOTAL SCORE: 1
AUDITORY DISTURBANCES: NOT PRESENT
ORIENTATION AND CLOUDING OF SENSORIUM: ORIENTED AND CAN DO SERIAL ADDITIONS
TREMOR: NO TREMOR
PAROXYSMAL SWEATS: NO SWEAT VISIBLE
AUDITORY DISTURBANCES: NOT PRESENT
AGITATION: NORMAL ACTIVITY
PAROXYSMAL SWEATS: NO SWEAT VISIBLE
HEADACHE, FULLNESS IN HEAD: NOT PRESENT
VISUAL DISTURBANCES: NOT PRESENT
TOTAL SCORE: 1
TREMOR: NO TREMOR
NAUSEA AND VOMITING: NO NAUSEA AND NO VOMITING
ANXIETY: MILDLY ANXIOUS
NAUSEA AND VOMITING: NO NAUSEA AND NO VOMITING
HEADACHE, FULLNESS IN HEAD: NOT PRESENT
AGITATION: NORMAL ACTIVITY

## 2019-01-22 ASSESSMENT — PAIN SCALES - GENERAL
PAINLEVEL_OUTOF10: 0

## 2019-01-22 NOTE — PROGRESS NOTES
Received report from night shift RNNayana. Discussed plan of care, updated white board. Pt is sleeping comfortably in bed, respirations even and unlabored. No distress noted at this time. Fall precautions in place. All needs met. Bed in lowest position. Call light within reach. Will continue to monitor.

## 2019-01-22 NOTE — DISCHARGE PLANNING
Legal Hold     Referral: Legal Hold Court     Intervention: Pt presented for legal hold meeting with  to discuss legal options of contesting hold and meeting with the court doctors tomorrow afternoon, or not contesting legal hold and continuing the hold for one week.  advised that pt's legal hold will be be continued for one week (1/31/19).       Plan: Pt's legal hold has been continued for one week. Pt awaiting transfer to an in patient psych facility.

## 2019-01-22 NOTE — PROGRESS NOTES
Hospital Medicine Daily Progress Note    Date of Service  1/22/2019    Chief Complaint  58 y.o. male admitted 1/20/2019 with benzodiazepine overdose.    Hospital Course    58-year-old gentleman who ingested approximately 105 1 mg Klonopin tablets over a 5-day period admitted on a psychiatric hold for intentional self-harm.  Patient was monitored on telemetry without arrhythmia, and without alterations in mental status, saturating well on room air, without need for supplemental O2.  Medically cleared for transfer, evaluation and treatment in the nearest accepting psychiatric facility.      Interval Problem Update  She is seen and evaluated on rounds  Discussed with nursing staff on rounds  Depressed, abnormal, slow affect  I would not recommend decreasing his Klonopin during hospitalization  Continue at home dose  Further weaning off Klonopin, detoxification and inpatient psychiatric rehab facility under close observation by psychiatrist  Medically cleared for discharge, awaiting disposition to inpatient psychiatric facility  Otherwise patient has no complaints  No telemetry needs, can be transferred to medical regular nursing floor    Consultants/Specialty  Psychiatry    Code Status  Full code    Disposition  No telemetry needs, can be transferred to medical regular nursing floor  Awaiting disposition to inpatient psychiatric facility    Review of Systems  Review of Systems   Constitutional: Positive for malaise/fatigue. Negative for chills, diaphoresis, fever and weight loss.   HENT: Negative for hearing loss and tinnitus.    Eyes: Negative for blurred vision and double vision.   Respiratory: Negative for cough, hemoptysis, sputum production, shortness of breath and wheezing.    Cardiovascular: Negative for chest pain, palpitations, orthopnea, claudication, leg swelling and PND.   Gastrointestinal: Negative for abdominal pain, blood in stool, constipation, diarrhea, heartburn, melena, nausea and vomiting.    Genitourinary: Negative for dysuria, flank pain, frequency, hematuria and urgency.   Musculoskeletal: Negative for back pain, falls, joint pain, myalgias and neck pain.   Skin: Negative for itching and rash.   Neurological: Positive for weakness. Negative for dizziness, tingling, tremors, sensory change, speech change, focal weakness, seizures, loss of consciousness and headaches.   Psychiatric/Behavioral: Positive for depression. Negative for hallucinations, memory loss, substance abuse and suicidal ideas. The patient is nervous/anxious and has insomnia.         Physical Exam  Temp:  [36.3 °C (97.4 °F)-36.9 °C (98.5 °F)] 36.5 °C (97.7 °F)  Pulse:  [54-88] 75  Resp:  [17-20] 17  BP: (104-127)/(68-85) 127/85  SpO2:  [93 %-100 %] 93 %    Physical Exam   Constitutional: He is oriented to person, place, and time. He appears well-developed and well-nourished. No distress.   Body mass index is 37.61 kg/m².   HENT:   Head: Normocephalic and atraumatic.   Mouth/Throat: No oropharyngeal exudate.   Eyes: Pupils are equal, round, and reactive to light. Conjunctivae and EOM are normal. No scleral icterus.   Neck: No JVD present.   Cardiovascular: Normal rate, regular rhythm and normal heart sounds.  Exam reveals no gallop and no friction rub.    No murmur heard.  Pulmonary/Chest: Effort normal and breath sounds normal. No stridor. No respiratory distress. He has no wheezes. He has no rales.   Abdominal: Soft. Bowel sounds are normal. He exhibits no distension. There is no tenderness. There is no rebound and no guarding.   Musculoskeletal: Normal range of motion. He exhibits no edema, tenderness or deformity.   Neurological: He is alert and oriented to person, place, and time. No cranial nerve deficit. Coordination normal.   Skin: Skin is warm and dry. He is not diaphoretic. No erythema.   Psychiatric: His mood appears anxious. His speech is delayed. He is slowed. He exhibits a depressed mood.       Fluids    Intake/Output  Summary (Last 24 hours) at 01/22/19 0831  Last data filed at 01/21/19 2000   Gross per 24 hour   Intake              580 ml   Output              800 ml   Net             -220 ml       Current Facility-Administered Medications:   •  pneumococcal vaccine (PNEUMOVAX-23) injection 25 mcg, 0.5 mL, Intramuscular, Once, Francois Cheng M.D.  •  clonazePAM (KLONOPIN) tablet 1 mg, 1 mg, Oral, 4X/DAY, Sarai Ponce M.D.  •  ARIPiprazole (ABILIFY) tablet 5 mg, 5 mg, Oral, DAILY, Danuta Carranza M.D., 5 mg at 01/22/19 0559  •  DULoxetine (CYMBALTA) capsule 30 mg, 30 mg, Oral, DAILY, Danuta Carranza M.D., 30 mg at 01/22/19 0558  •  propranolol (INDERAL) tablet 20 mg, 20 mg, Oral, TID, Francois Cheng M.D., 20 mg at 01/22/19 0559  •  senna-docusate (PERICOLACE or SENOKOT S) 8.6-50 MG per tablet 2 Tab, 2 Tab, Oral, BID, 2 Tab at 01/22/19 0559 **AND** polyethylene glycol/lytes (MIRALAX) PACKET 1 Packet, 1 Packet, Oral, QDAY PRN **AND** magnesium hydroxide (MILK OF MAGNESIA) suspension 30 mL, 30 mL, Oral, QDAY PRN **AND** bisacodyl (DULCOLAX) suppository 10 mg, 10 mg, Rectal, QDAY PRN, Nigel Melendez M.D.  •  Respiratory Care per Protocol, , Nebulization, Continuous RT, Nigel Melendez M.D.  •  ondansetron (ZOFRAN) syringe/vial injection 4 mg, 4 mg, Intravenous, Q4HRS PRN, Nigel Melendez M.D.  •  ondansetron (ZOFRAN ODT) dispertab 4 mg, 4 mg, Oral, Q4HRS PRN, Nigel Melendez M.D.  •  promethazine (PHENERGAN) tablet 12.5-25 mg, 12.5-25 mg, Oral, Q4HRS PRN, Nigel Melendez M.D.  •  promethazine (PHENERGAN) suppository 12.5-25 mg, 12.5-25 mg, Rectal, Q4HRS PRN, Nigel Melendez M.D.  •  enoxaparin (LOVENOX) inj 40 mg, 40 mg, Subcutaneous, DAILY, Nigel Melendez M.D., 40 mg at 01/22/19 0559  •  acetaminophen (TYLENOL) tablet 650 mg, 650 mg, Oral, Q6HRS PRN, Nigel Melendez M.D., 650 mg at 01/20/19 1447  •  gabapentin (NEURONTIN) capsule 300 mg, 300 mg, Oral, 4X/DAY, Igor León M.D., 300 mg at 01/21/19 2240  •   amLODIPine (NORVASC) tablet 10 mg, 10 mg, Oral, DAILY, Nigel Melendez M.D., 10 mg at 01/22/19 0559  •  atorvastatin (LIPITOR) tablet 40 mg, 40 mg, Oral, QHS, Nigel Melendez M.D., 40 mg at 01/21/19 2240  •  metFORMIN (GLUCOPHAGE) tablet 500 mg, 500 mg, Oral, BID WITH MEALS, Nigel Melendez M.D., 500 mg at 01/21/19 1659  •  haloperidol lactate (HALDOL) injection 2-5 mg, 2-5 mg, Intravenous, Q4HRS PRN, Nigel Melendez M.D.      Laboratory  Recent Labs      01/20/19   1000   WBC  7.8   RBC  4.97   HEMOGLOBIN  15.1   HEMATOCRIT  42.9   MCV  86.3   MCH  30.4   MCHC  35.2   RDW  40.6   PLATELETCT  250   MPV  9.8     Recent Labs      01/20/19   1000  01/21/19   0327   SODIUM  139  142   POTASSIUM  3.7  3.6   CHLORIDE  105  107   CO2  26  25   GLUCOSE  127*  114*   BUN  10  11   CREATININE  0.78  0.68   CALCIUM  9.4  9.0                   Imaging  DX-CHEST-PORTABLE (1 VIEW)   Final Result      No acute cardiopulmonary process is seen.           Assessment/Plan  * Benzodiazepine overdose of undetermined intent- (present on admission)   Assessment & Plan    Patient on legal hold, extended  Evaluated by psychiatry  Continue Klonopin home dose to avoid benzodiazepine withdrawal  Anticipate placement inpatient psychiatric rehab for benzodiazepine detoxification and monitoring of suicidal concerns  Appreciate psychiatry input     Bipolar disorder (HCC)- (present on admission)   Assessment & Plan    Continue current regimen  Further titration following discharge at inpatient psychiatric rehab hospital under guidance of psychiatrist      Type 2 diabetes mellitus without complication (HCC)- (present on admission)   Assessment & Plan    Continue metformin     Essential hypertension- (present on admission)   Assessment & Plan    Amlodipine and Inderal          VTE prophylaxis: Lovenox

## 2019-01-22 NOTE — PROGRESS NOTES
"Hospital Medicine Daily Progress Note    Date of Service  1/21/2019    Chief Complaint  58 y.o. male admitted 1/20/2019 with benzodiazepine overdose.    Hospital Course    58-year-old gentleman who ingested approximately 105 1 mg Klonopin tablets over a 5-day period admitted on a psychiatric hold for intentional self-harm.  Patient was monitored on telemetry without arrhythmia, and without alterations in mental status, saturating well on room air, without need for supplemental O2.  Medically cleared for transfer, evaluation and treatment in the nearest accepting psychiatric facility.      Interval Problem Update  No acute overnight events.  Patient indicates that his anxiety levels are \"so-so\", no tremor at this time.  He is alert and oriented x4.    Consultants/Specialty  Psychiatry    Code Status  Full    Disposition  Medically cleared for discharge to an inpatient psychiatric facility.    Review of Systems  Review of Systems   Constitutional: Negative for chills and fever.   Respiratory: Negative for shortness of breath.    Cardiovascular: Negative for chest pain.   All other systems reviewed and are negative.       Physical Exam  Temp:  [36.1 °C (97 °F)-37 °C (98.6 °F)] 36.7 °C (98.1 °F)  Pulse:  [54-76] 54  Resp:  [16-24] 20  BP: (103-114)/(68-75) 104/70  SpO2:  [90 %-100 %] 100 %    Physical Exam   Constitutional: He is oriented to person, place, and time. He appears well-developed and well-nourished. No distress.   HENT:   Head: Normocephalic and atraumatic.   Eyes: Pupils are equal, round, and reactive to light. Conjunctivae and EOM are normal.   Cardiovascular: Normal rate, regular rhythm and normal heart sounds.  Exam reveals no gallop and no friction rub.    No murmur heard.  Pulmonary/Chest: Effort normal and breath sounds normal. No respiratory distress. He has no wheezes. He has no rales.   Abdominal: Soft. Bowel sounds are normal. He exhibits no distension. There is no tenderness. There is no rebound. "   Musculoskeletal: Normal range of motion. He exhibits no edema, tenderness or deformity.   Neurological: He is alert and oriented to person, place, and time. No cranial nerve deficit.   Skin: Skin is warm and dry.       Fluids    Intake/Output Summary (Last 24 hours) at 01/21/19 1701  Last data filed at 01/21/19 1655   Gross per 24 hour   Intake              480 ml   Output              200 ml   Net              280 ml       Current Facility-Administered Medications:   •  [START ON 1/22/2019] clonazePAM (KLONOPIN) tablet 1 mg, 1 mg, Oral, BID, Danuta Carranza M.D.  •  ARIPiprazole (ABILIFY) tablet 5 mg, 5 mg, Oral, DAILY, Danuta Carranza M.D., 5 mg at 01/21/19 1433  •  [START ON 1/22/2019] DULoxetine (CYMBALTA) capsule 30 mg, 30 mg, Oral, DAILY, Danuta Carranza M.D.  •  [START ON 1/22/2019] propranolol (INDERAL) tablet 20 mg, 20 mg, Oral, TID, Francois Cheng M.D.  •  senna-docusate (PERICOLACE or SENOKOT S) 8.6-50 MG per tablet 2 Tab, 2 Tab, Oral, BID, 2 Tab at 01/21/19 1700 **AND** polyethylene glycol/lytes (MIRALAX) PACKET 1 Packet, 1 Packet, Oral, QDAY PRN **AND** magnesium hydroxide (MILK OF MAGNESIA) suspension 30 mL, 30 mL, Oral, QDAY PRN **AND** bisacodyl (DULCOLAX) suppository 10 mg, 10 mg, Rectal, QDAY PRN, Nigel Melendez M.D.  •  Respiratory Care per Protocol, , Nebulization, Continuous RT, Nigel Melendez M.D.  •  ondansetron (ZOFRAN) syringe/vial injection 4 mg, 4 mg, Intravenous, Q4HRS PRN, Nigel Melendez M.D.  •  ondansetron (ZOFRAN ODT) dispertab 4 mg, 4 mg, Oral, Q4HRS PRN, Nigel Melendez M.D.  •  promethazine (PHENERGAN) tablet 12.5-25 mg, 12.5-25 mg, Oral, Q4HRS PRN, Nigel Melendez M.D.  •  promethazine (PHENERGAN) suppository 12.5-25 mg, 12.5-25 mg, Rectal, Q4HRS PRN, Nigel Melendez M.D.  •  enoxaparin (LOVENOX) inj 40 mg, 40 mg, Subcutaneous, DAILY, Nigel Melendez M.D., 40 mg at 01/21/19 0525  •  acetaminophen (TYLENOL) tablet 650 mg, 650 mg, Oral, Q6HRS PRN,  Nigel Melendez M.D., 650 mg at 01/20/19 1447  •  gabapentin (NEURONTIN) capsule 300 mg, 300 mg, Oral, 4X/DAY, Igor León M.D., 300 mg at 01/21/19 1700  •  amLODIPine (NORVASC) tablet 10 mg, 10 mg, Oral, DAILY, Nigel Melendez M.D., Stopped at 01/21/19 0600  •  atorvastatin (LIPITOR) tablet 40 mg, 40 mg, Oral, QHS, Nigel Melendez M.D., 40 mg at 01/20/19 2136  •  metFORMIN (GLUCOPHAGE) tablet 500 mg, 500 mg, Oral, BID WITH MEALS, Nigel Melendez M.D., 500 mg at 01/21/19 1659  •  haloperidol lactate (HALDOL) injection 2-5 mg, 2-5 mg, Intravenous, Q4HRS PRN, Nigel Melendez M.D.      Laboratory  Recent Labs      01/20/19   1000   WBC  7.8   RBC  4.97   HEMOGLOBIN  15.1   HEMATOCRIT  42.9   MCV  86.3   MCH  30.4   MCHC  35.2   RDW  40.6   PLATELETCT  250   MPV  9.8     Recent Labs      01/20/19   1000  01/21/19   0327   SODIUM  139  142   POTASSIUM  3.7  3.6   CHLORIDE  105  107   CO2  26  25   GLUCOSE  127*  114*   BUN  10  11   CREATININE  0.78  0.68   CALCIUM  9.4  9.0                   Imaging  DX-CHEST-PORTABLE (1 VIEW)   Final Result      No acute cardiopulmonary process is seen.           Assessment/Plan  * Benzodiazepine overdose of undetermined intent- (present on admission)   Assessment & Plan    Patient has taken 108 pills of clonazepam 1 mg in the past 5 days  He initially denied suicidal intent though has a history of suicidal ideation in the past and his actions are most consistent with self-harm therefore a legal hold is been initiated in the emergency room and psychiatry has been consulted  IV Haldol has been ordered in case he develops significant agitation  [place] controlled substance database query, patient has been receiving 90 tablets of 1 mg clonazepam monthly for approximately 1 year.  This was increased to 120 tablets of 1 mg clonazepam on January 5 of this year.  I do feel that 1 mg of clonazepam 3 times daily will be very well-tolerated moving forward, and will prevent any withdrawal  agitation or delirium.  I do welcome any input psychiatry would like to make in this regard.     Type 2 diabetes mellitus without complication (HCC)- (present on admission)   Assessment & Plan    Continue metformin     Hypertension- (present on admission)   Assessment & Plan    Norvasc 10 mg daily with holding parameters as well as propranolol 40 mg 3 times a day also with holding parameters.   Propranolol down titrated to 20 mg 3 times a day on 1/21/19 secondary to the borderline bradycardia.     Bipolar disorder (HCC)- (present on admission)   Assessment & Plan    Continue with Zyprexa and Cymbalta          VTE prophylaxis: LMWH

## 2019-01-22 NOTE — CARE PLAN
Problem: Communication  Goal: The ability to communicate needs accurately and effectively will improve  Outcome: PROGRESSING AS EXPECTED  Encourage pt to voice concerns and feelings.     Problem: Safety  Goal: Will remain free from falls  Outcome: PROGRESSING AS EXPECTED  q15 min obs, bed low and locked, nonskid socks on, call bell within reach

## 2019-01-22 NOTE — DISCHARGE PLANNING
Agency/Facility Name: [facility]  Spoke To: Danuta  Outcome: Requesting pt's referral. Sent referral to [facility] and [place] at 1104. Sent copy of legal hold. Once verified petition is received will send copy to  and [facility].

## 2019-01-22 NOTE — PROGRESS NOTES
Report recvd at bedside. POC discussed. Fall precautions inplace. Pt denies pain at this time.  Call bell withinreach. Will continue to  Monitor.

## 2019-01-22 NOTE — DISCHARGE PLANNING
Received verified petition from the court. Sent copy to [facility].  Sent copy to [facility] and [place].

## 2019-01-22 NOTE — DISCHARGE PLANNING
Annette with [facility] phoned stating pt is under review with their team. Bedside nurses phone number given to Annette per request. [facility] nurse, De, notified.

## 2019-01-22 NOTE — CARE PLAN
Problem: Communication  Goal: The ability to communicate needs accurately and effectively will improve  Outcome: PROGRESSING AS EXPECTED  Discussed plan of care, reviewed meds with pt, encouraged pt to voice any questions and/or concerns regarding care.       Problem: Safety  Goal: Will remain free from falls  Outcome: PROGRESSING AS EXPECTED  Assessed fall risk, fall precautions initiated. Educated patient on use of call light, no slip socks on, bed lowest position. All needs attended to. Patient verbalized understanding. Pt calls appropriately.      Problem: Psychosocial Needs:  Goal: Level of anxiety will decrease  Outcome: PROGRESSING AS EXPECTED

## 2019-01-22 NOTE — PSYCHIATRY
"PSYCHIATRIC FOLLOW UP:      Reason for Admission: drug overdose  Reason for Consult: Benzo Overdose  Requesting Provider: Dr. Melendez     HPI:       Patient is a 58-year-old male with history of anxiety who was brought to the ED this morning after purposeful klonopin overdose. He was seen by psych yesterday, not started on an any new medications yet. He speaks very little, answers only in nods and one-word answers. He reports he was on duloxetine 60mg at home recently and it was helping. This wasn't on the home meds, unsure if his reports is good. He reports that he thought the klonopin increase was making him worse. He reports he wants to come off it. He appears internally stimulated at times. He appears afraid. He is unable to give me much history.     Psychiatric Examination: observed phenomenon:  Vitals: /85   Pulse 83   Temp 36.1 °C (96.9 °F) (Temporal)   Resp 20   Ht 1.829 m (6')   Wt (!) 125.8 kg (277 lb 5.4 oz)   SpO2 100%   BMI 37.61 kg/m²  Body mass index is 37.61 kg/m².  Musculoskeletal psychomotor retardation   Appearance: Grooming wnl   Thoughts Process: concrete   Thought Content: No a/vh, no evidence of delusions, no ideas of reference, no internal stimulation noted   Speech:sparse. Slow   Mood:    Depressed   Affect:    Blunted   SI/HI:   Does not reply about questions of si   Attention/Alertness:    Awake, alert , attention fair   Memory:    Grossly intact.  Cognition:appears impaired   Insight:poor   Judgement:  Poor   Neurological Testing:    Medical systems reviewed:   Eyes: no blurry vision   Skin:no rash   CV:denies cp   Lungs:no sob   Neuro: appears confused. Denies ha. Reports he is weak \"in my body\" and can't expand on what he means. No numbness/tingling.   GI: no diarrhea/constipation   :no dysuria   Endocrine:no issues   Psych:see HPI   Musculoskeletal:  Vague pain   All other systems reviewed and are negative.       Soc history:    Poor social support  Lives alone, " unemployed.   Collects guns as hobbyand does not want to get rid of them.     Lab results/tests:   Recent Results (from the past 48 hour(s))   COMP METABOLIC PANEL    Collection Time: 01/21/19  3:27 AM   Result Value Ref Range    Sodium 142 135 - 145 mmol/L    Potassium 3.6 3.6 - 5.5 mmol/L    Chloride 107 96 - 112 mmol/L    Co2 25 20 - 33 mmol/L    Anion Gap 10.0 0.0 - 11.9    Glucose 114 (H) 65 - 99 mg/dL    Bun 11 8 - 22 mg/dL    Creatinine 0.68 0.50 - 1.40 mg/dL    Calcium 9.0 8.5 - 10.5 mg/dL    AST(SGOT) 19 12 - 45 U/L    ALT(SGPT) 41 2 - 50 U/L    Alkaline Phosphatase 68 30 - 99 U/L    Total Bilirubin 1.0 0.1 - 1.5 mg/dL    Albumin 3.7 3.2 - 4.9 g/dL    Total Protein 5.8 (L) 6.0 - 8.2 g/dL    Globulin 2.1 1.9 - 3.5 g/dL    A-G Ratio 1.8 g/dL   ESTIMATED GFR    Collection Time: 01/21/19  3:27 AM   Result Value Ref Range    GFR If African American >60 >60 mL/min/1.73 m 2    GFR If Non African American >60 >60 mL/min/1.73 m 2     DX-CHEST-PORTABLE (1 VIEW)   Final Result      No acute cardiopulmonary process is seen.               Assessment:  Depressive disorder unspecified  Anxiety disorder unspecified  R/o developmental delay  R/o psychosis          Plan:  legal hold: extended  Continue gabapentin, ciwa  Decrease dose of klonopin   Adding cymbalta to prevent withdrawal from it if he was really taking it.   Adding abilify 5mg daily as adjunct.     Will follow/.     He is currently unable to care for himself due to symtpoms of depression. He is currently barely speaking and not participating in exam. He has poor insight and is high risk for another attempt.

## 2019-01-22 NOTE — DISCHARGE PLANNING
Met with Dr. Ponce to sign the certification on page two of legal hold. Certification has been filled out, placed original legal hold back on chart.

## 2019-01-23 DIAGNOSIS — E11.9 TYPE 2 DIABETES MELLITUS WITHOUT COMPLICATION, WITHOUT LONG-TERM CURRENT USE OF INSULIN (HCC): ICD-10-CM

## 2019-01-23 DIAGNOSIS — E78.2 MIXED HYPERLIPIDEMIA: ICD-10-CM

## 2019-01-23 PROCEDURE — 700102 HCHG RX REV CODE 250 W/ 637 OVERRIDE(OP): Performed by: HOSPITALIST

## 2019-01-23 PROCEDURE — A9270 NON-COVERED ITEM OR SERVICE: HCPCS | Performed by: HOSPITALIST

## 2019-01-23 PROCEDURE — 700102 HCHG RX REV CODE 250 W/ 637 OVERRIDE(OP): Performed by: PSYCHIATRY & NEUROLOGY

## 2019-01-23 PROCEDURE — 700105 HCHG RX REV CODE 258

## 2019-01-23 PROCEDURE — A9270 NON-COVERED ITEM OR SERVICE: HCPCS | Performed by: PSYCHIATRY & NEUROLOGY

## 2019-01-23 PROCEDURE — 700111 HCHG RX REV CODE 636 W/ 250 OVERRIDE (IP): Performed by: HOSPITALIST

## 2019-01-23 PROCEDURE — 700102 HCHG RX REV CODE 250 W/ 637 OVERRIDE(OP): Performed by: INTERNAL MEDICINE

## 2019-01-23 PROCEDURE — 99231 SBSQ HOSP IP/OBS SF/LOW 25: CPT | Performed by: INTERNAL MEDICINE

## 2019-01-23 PROCEDURE — A9270 NON-COVERED ITEM OR SERVICE: HCPCS | Performed by: INTERNAL MEDICINE

## 2019-01-23 PROCEDURE — 770001 HCHG ROOM/CARE - MED/SURG/GYN PRIV*

## 2019-01-23 RX ORDER — ATORVASTATIN CALCIUM 40 MG/1
TABLET, FILM COATED ORAL
Qty: 90 TAB | Refills: 3 | Status: SHIPPED | OUTPATIENT
Start: 2019-01-23

## 2019-01-23 RX ORDER — SODIUM CHLORIDE 9 MG/ML
INJECTION, SOLUTION INTRAVENOUS
Status: ACTIVE
Start: 2019-01-23 | End: 2019-01-24

## 2019-01-23 RX ADMIN — DULOXETINE HYDROCHLORIDE 30 MG: 30 CAPSULE, DELAYED RELEASE ORAL at 06:21

## 2019-01-23 RX ADMIN — GABAPENTIN 300 MG: 300 CAPSULE ORAL at 19:55

## 2019-01-23 RX ADMIN — PROPRANOLOL HYDROCHLORIDE 20 MG: 20 TABLET ORAL at 06:21

## 2019-01-23 RX ADMIN — GABAPENTIN 300 MG: 300 CAPSULE ORAL at 17:13

## 2019-01-23 RX ADMIN — SENNOSIDES AND DOCUSATE SODIUM 2 TABLET: 8.6; 5 TABLET ORAL at 06:21

## 2019-01-23 RX ADMIN — ARIPIPRAZOLE 5 MG: 10 TABLET ORAL at 06:20

## 2019-01-23 RX ADMIN — CLONAZEPAM 1 MG: 1 TABLET ORAL at 17:13

## 2019-01-23 RX ADMIN — ATORVASTATIN CALCIUM 40 MG: 40 TABLET, FILM COATED ORAL at 19:55

## 2019-01-23 RX ADMIN — METFORMIN HYDROCHLORIDE 500 MG: 500 TABLET ORAL at 17:13

## 2019-01-23 RX ADMIN — METFORMIN HYDROCHLORIDE 500 MG: 500 TABLET ORAL at 09:33

## 2019-01-23 RX ADMIN — AMLODIPINE BESYLATE 10 MG: 10 TABLET ORAL at 06:20

## 2019-01-23 RX ADMIN — CLONAZEPAM 1 MG: 1 TABLET ORAL at 09:33

## 2019-01-23 RX ADMIN — CLONAZEPAM 1 MG: 1 TABLET ORAL at 12:10

## 2019-01-23 RX ADMIN — PROPRANOLOL HYDROCHLORIDE 20 MG: 20 TABLET ORAL at 12:10

## 2019-01-23 RX ADMIN — ENOXAPARIN SODIUM 40 MG: 100 INJECTION SUBCUTANEOUS at 06:25

## 2019-01-23 RX ADMIN — PROPRANOLOL HYDROCHLORIDE 20 MG: 20 TABLET ORAL at 17:13

## 2019-01-23 RX ADMIN — GABAPENTIN 300 MG: 300 CAPSULE ORAL at 09:33

## 2019-01-23 RX ADMIN — CLONAZEPAM 1 MG: 1 TABLET ORAL at 19:55

## 2019-01-23 RX ADMIN — GABAPENTIN 300 MG: 300 CAPSULE ORAL at 12:10

## 2019-01-23 ASSESSMENT — ENCOUNTER SYMPTOMS
NAUSEA: 0
DOUBLE VISION: 0
CONSTIPATION: 0
WHEEZING: 0
FALLS: 0
CLAUDICATION: 0
TREMORS: 0
INSOMNIA: 1
SHORTNESS OF BREATH: 0
DEPRESSION: 1
FOCAL WEAKNESS: 0
SPUTUM PRODUCTION: 0
TINGLING: 0
ORTHOPNEA: 0
FEVER: 0
SENSORY CHANGE: 0
DIARRHEA: 0
FLANK PAIN: 0
HEADACHES: 0
HALLUCINATIONS: 0
VOMITING: 0
MEMORY LOSS: 0
BLOOD IN STOOL: 0
SPEECH CHANGE: 0
BACK PAIN: 0
NECK PAIN: 0
HEARTBURN: 0
COUGH: 0
ABDOMINAL PAIN: 0
DIAPHORESIS: 0
CHILLS: 0
SEIZURES: 0
HEMOPTYSIS: 0
LOSS OF CONSCIOUSNESS: 0
WEAKNESS: 1
MYALGIAS: 0
PALPITATIONS: 0
DIZZINESS: 0
PND: 0
BLURRED VISION: 0
WEIGHT LOSS: 0
NERVOUS/ANXIOUS: 1

## 2019-01-23 ASSESSMENT — PAIN SCALES - GENERAL: PAINLEVEL_OUTOF10: 0

## 2019-01-23 ASSESSMENT — LIFESTYLE VARIABLES: SUBSTANCE_ABUSE: 0

## 2019-01-23 NOTE — DISCHARGE SUMMARY
Discharge Summary    CHIEF COMPLAINT ON ADMISSION  Chief Complaint   Patient presents with   • Drug Overdose     EMS reports pt had (120) 1mg tabs of Clonazepam filled 5 days ago and the pt only has 12 pills left. 88 missing pills.       Reason for Admission  EMS     Admission Date  1/20/2019    CODE STATUS  Full Code    HPI & HOSPITAL COURSE  This is a 58 y.o. male here with BZD overdose.       58-year-old gentleman who ingested approximately 105 1 mg Klonopin tablets over a 5-day period admitted on a psychiatric hold for intentional self-harm.  Patient was monitored on telemetry without arrhythmia, and without alterations in mental status, saturating well on room air, without need for supplemental O2.  Medically cleared for transfer, evaluation and treatment in the nearest accepting psychiatric facility.     To note patient presented with anxiety.  He has an underlying history of diabetes and hypertension, bipolar disorder.  He has been on clonazepam.  Presentation with benzodiazepine overdose, unable to rule out suicidal intent.  He was admitted to the hospital, remained stable with respect to medical complications of benzodiazepine overdose.  Seen by psychiatry, legal hold was extended which was placed on presentation.  Recommendations for inpatient psychiatric rehabilitation/transfer to a psychiatric hospital per psychiatry team.  He was maintained on his baseline regimen of Klonopin.  Abilify was added per psychiatry, Zyprexa was stopped.  In addition gabapentin was added.  Patient will need weaning of his benzodiazepines in a controlled setting at a psychiatric rehab facility.  At this time patient is being medically cleared for transfer to an inpatient psychiatric facility.  He is being discharged in stable condition.    Therefore, he is discharged in good and stable condition to a psychiatric hospital.    The patient met 2-midnight criteria for an inpatient stay at the time of discharge.    Discharge  Date  01/23/19    FOLLOW UP ITEMS POST DISCHARGE  F/U PCP following discharge from the psychiatric facility     DISCHARGE DIAGNOSES  Principal Problem:    Benzodiazepine overdose of undetermined intent POA: Yes  Active Problems:    Bipolar disorder (HCC) POA: Yes      Overview: Goes to Dr Sivak[facility]    Essential hypertension POA: Yes    Type 2 diabetes mellitus without complication (HCC) POA: Yes  Resolved Problems:    * No resolved hospital problems. *      FOLLOW UP  No future appointments.  No follow-up provider specified.    MEDICATIONS ON DISCHARGE     Medication List      START taking these medications      Instructions   aripiprazole 5 MG tablet  Commonly known as:  ABILIFY   Take 1 Tab by mouth every day.  Dose:  5 mg     gabapentin 300 MG Caps  Commonly known as:  NEURONTIN   Take 1 Cap by mouth 4 times a day.  Dose:  300 mg        CONTINUE taking these medications      Instructions   amLODIPine 10 MG Tabs  Commonly known as:  NORVASC   Take 1 Tab by mouth every day.  Dose:  10 mg     atorvastatin 40 MG Tabs  Commonly known as:  LIPITOR   TAKE 1 TAB BY MOUTH EVERY BEDTIME.  Dose:  40 mg     clonazePAM 1 MG Tabs  Commonly known as:  KLONOPIN   Take 1 mg by mouth 4 times a day.  Dose:  1 mg     DULoxetine 60 MG Cpep delayed-release capsule  Commonly known as:  CYMBALTA   Take 60 mg by mouth every day.  Dose:  60 mg     metFORMIN 500 MG Tabs  Commonly known as:  GLUCOPHAGE   TAKE 1 TAB BY MOUTH 2 TIMES A DAY, WITH MEALS.     propranolol 40 MG Tabs  Commonly known as:  INDERAL   TAKE 1 TAB BY MOUTH 2 TIMES A DAY. STOP METOPROLOL        STOP taking these medications    olanzapine 10 MG tablet  Commonly known as:  ZYPREXA            Allergies  Allergies   Allergen Reactions   • Diflunisal      Sweating and weakness   • Lidocaine Nausea   • Lisinopril      DIZZINESS   • Nabumetone Nausea   • Prednisone      Agitation  and Depression       DIET  Orders Placed This Encounter   Procedures   • Diet Order  Regular (Paper or Plastic Utensils Only)     Standing Status:   Standing     Number of Occurrences:   1     Order Specific Question:   Diet:     Answer:   Regular [1]     Comments:   Paper or Plastic Utensils Only     Order Specific Question:   Miscellaneous modifications:     Answer:   [employer]  [2]       ACTIVITY  As tolerated.  Weight bearing as tolerated    CONSULTATIONS  Psychiatry     PROCEDURES  None    LABORATORY  Lab Results   Component Value Date    SODIUM 142 01/21/2019    POTASSIUM 3.6 01/21/2019    CHLORIDE 107 01/21/2019    CO2 25 01/21/2019    GLUCOSE 114 (H) 01/21/2019    BUN 11 01/21/2019    CREATININE 0.68 01/21/2019    CREATININE 1.2 01/18/2006        Lab Results   Component Value Date    WBC 7.8 01/20/2019    HEMOGLOBIN 15.1 01/20/2019    HEMATOCRIT 42.9 01/20/2019    PLATELETCT 250 01/20/2019        Total time of the discharge process exceeds 32 minutes.

## 2019-01-23 NOTE — CARE PLAN
Problem: Knowledge Deficit  Goal: Knowledge of disease process/condition, treatment plan, diagnostic tests, and medications will improve  POC discussed with the patient. All questions and concerns addressed and answered. Patient is involved in POC and verbalizes the understanding of the disease process, medications, tests and treatments. Questions on POC encouraged throughout care.       Problem: Psychosocial Needs:  Goal: Level of anxiety will decrease  Anxiety triggers identified. Calming techniques provided to patient. Patient is involved in POC and is encouraged to verbalize questions and concerns.

## 2019-01-23 NOTE — PROGRESS NOTES
Received bedside report from PM nurse. Assumed patient care. Chart reviewed. Pt was resting in bed. A&O x 3. Patient denies pain. Legal 2000. Q 15 checks. POC updated with pt and on the patient communication board. Bed locked and in the lowest position. Call light within reach. Medical status. Will continue to monitor.

## 2019-01-23 NOTE — CARE PLAN
Problem: Communication  Goal: The ability to communicate needs accurately and effectively will improve  Outcome: PROGRESSING AS EXPECTED  Encourage pt to voice concerns and feelings.     Problem: Safety  Goal: Will remain free from falls  Outcome: PROGRESSING AS EXPECTED  q15 min obs, bed low and locked, nonskid socks on, call bell within reach.

## 2019-01-23 NOTE — PROGRESS NOTES
Report recvd at bedside. Pt sitting up in the chair eating dinner. POC discussed. Pt denies pain at this time. Call melvi hartman. Will continue to monitor.

## 2019-01-23 NOTE — DISCHARGE PLANNING
Received Stipulation and Order from the court continuing pt until 1/31/19. Sent copy to unit LSW.

## 2019-01-24 VITALS
HEART RATE: 80 BPM | WEIGHT: 271.61 LBS | TEMPERATURE: 96.9 F | HEIGHT: 72 IN | BODY MASS INDEX: 36.79 KG/M2 | DIASTOLIC BLOOD PRESSURE: 91 MMHG | SYSTOLIC BLOOD PRESSURE: 142 MMHG | OXYGEN SATURATION: 93 % | RESPIRATION RATE: 18 BRPM

## 2019-01-24 PROCEDURE — A9270 NON-COVERED ITEM OR SERVICE: HCPCS | Performed by: INTERNAL MEDICINE

## 2019-01-24 PROCEDURE — 700102 HCHG RX REV CODE 250 W/ 637 OVERRIDE(OP): Performed by: INTERNAL MEDICINE

## 2019-01-24 PROCEDURE — 700102 HCHG RX REV CODE 250 W/ 637 OVERRIDE(OP): Performed by: HOSPITALIST

## 2019-01-24 PROCEDURE — 700102 HCHG RX REV CODE 250 W/ 637 OVERRIDE(OP): Performed by: PSYCHIATRY & NEUROLOGY

## 2019-01-24 PROCEDURE — A9270 NON-COVERED ITEM OR SERVICE: HCPCS | Performed by: PSYCHIATRY & NEUROLOGY

## 2019-01-24 PROCEDURE — A9270 NON-COVERED ITEM OR SERVICE: HCPCS | Performed by: HOSPITALIST

## 2019-01-24 PROCEDURE — 99239 HOSP IP/OBS DSCHRG MGMT >30: CPT | Performed by: INTERNAL MEDICINE

## 2019-01-24 PROCEDURE — 700111 HCHG RX REV CODE 636 W/ 250 OVERRIDE (IP): Performed by: HOSPITALIST

## 2019-01-24 RX ADMIN — GABAPENTIN 300 MG: 300 CAPSULE ORAL at 13:09

## 2019-01-24 RX ADMIN — PROPRANOLOL HYDROCHLORIDE 20 MG: 20 TABLET ORAL at 13:09

## 2019-01-24 RX ADMIN — CLONAZEPAM 1 MG: 1 TABLET ORAL at 13:09

## 2019-01-24 RX ADMIN — ENOXAPARIN SODIUM 40 MG: 100 INJECTION SUBCUTANEOUS at 05:34

## 2019-01-24 RX ADMIN — DULOXETINE HYDROCHLORIDE 30 MG: 30 CAPSULE, DELAYED RELEASE ORAL at 05:33

## 2019-01-24 RX ADMIN — METFORMIN HYDROCHLORIDE 500 MG: 500 TABLET ORAL at 08:19

## 2019-01-24 RX ADMIN — GABAPENTIN 300 MG: 300 CAPSULE ORAL at 08:19

## 2019-01-24 RX ADMIN — ARIPIPRAZOLE 5 MG: 10 TABLET ORAL at 05:34

## 2019-01-24 RX ADMIN — CLONAZEPAM 1 MG: 1 TABLET ORAL at 08:19

## 2019-01-24 NOTE — PROGRESS NOTES
Received bedside report from PM nurse. Assumed patient care. Chart reviewed. Pt was resting in bed. Patient denies pain. Legal 2000. Q 15 checks. POC updated with pt and on the patient communication board. Bed locked and in the lowest position. Call light within reach. Medical status. Will continue to monitor.

## 2019-01-24 NOTE — DISCHARGE PLANNING
LSW received page from Tele 7 charge RN regarding bedside RN receiving call from [place] regarding Pt transferring to [facility] facility     LSW spoke Annette with [facility] admission team regarding acceptance of Pt, LSW advised they are able to accept Pt but do not have a bed until the morning, LSW informed to have Unit RNCM or [facility] call back in the AM regarding time for transfer, LSW provided update to Tele 7 Charge RN and left report for Unit RNCM regarding pending transfer.

## 2019-01-24 NOTE — DISCHARGE INSTRUCTIONS
"Discharge Instructions    Discharged to other by medical transportation with escort. Discharged via wheelchair, hospital escort: Yes.  Special equipment needed: Not Applicable    Be sure to schedule a follow-up appointment with your primary care doctor or any specialists as instructed.     Discharge Plan:   Influenza Vaccine Indication: Indicated: 9 to 64 years of age  Influenza Vaccine Given - only chart on this line when given: Influenza Vaccine Given (See MAR)    I understand that a diet low in cholesterol, fat, and sodium is recommended for good health. Unless I have been given specific instructions below for another diet, I accept this instruction as my diet prescription.     Special Instructions:    Overdose, Adult  A person can overdose on alcohol, drugs or both by accident or on purpose. If it was on purpose, it is a serious matter. Professional help should be sought. If the overdose was an accident, certain steps should be taken to make sure that it never happens again.  ACCIDENTAL OVERDOSE  Overdosing on prescription medications can be a result of:  · Not understanding the instructions.  · Misreading the label.  · Forgetting that you took a dose and then taking another by mistake. This situation happens a lot.  To make sure this does not happen again:  · Clarify the correct dosage with your caregiver.  · Place the correct dosage in a \"pill-minder\" container (labeled for each day and time of day).  · Have someone dispense your medicine.  Please be sure to follow-up with your primary care doctor as directed.  INTENTIONAL OVERDOSE  If the overdose was on purpose, it is a serious situation. Taking more than the prescribed amount of medications (including taking someone else's prescription), abusing street drugs or drinking an amount of alcohol that requires medical treatment can show a variety of possible problems. These may indicate you:  · Are depressed or suicidal.  · Are abusing drugs, took too much or " "combined different drugs to experiment with the effects.  · Mixed alcohol with drugs and did not realize the danger of doing so (this is drug abuse).  · Are suffering addiction to drugs and/or alcohol (also known as chemical dependency).  · Binge drink.  If you have not been referred to a mental health professional for help, it is important that you get help right away. Only a professional can determine which problems may exist and what the best course of treatment may be. It is your responsibility to follow-up with further evaluation or treatment as directed.   Alcohol is responsible for a large number of overdoses and unintended deaths among college-age young adults. Binge drinking is consuming 4-5 drinks in a short period of time. The amount of alcohol in standard servings of wine (5 oz.), beer (12 oz.) and distilled spirits (1.5 oz., 80 proof) is the same. Beer or wine can be just as dangerous to the binge drinker as \"hard\" liquor can be.   CONSEQUENCES OF BINGE DRINKING  Alcohol poisoning is the most serious consequence of binge drinking. This is a severe and potentially fatal physical reaction to an alcohol overdose. When too much alcohol is consumed, the brain does not get enough oxygen. The lack of oxygen will eventually cause the brain to shut down the voluntary functions that regulate breathing and heart rate. Symptoms of alcohol poisoning include:  · Vomiting.  · Passing out (unconsciousness).  · Cold, clammy, pale or bluish skin.  · Slow or irregular breathing.  WHAT SHOULD I DO NEXT?  If you have a history of drug abuse or suffer chemical dependency (alcoholism, drug addiction or both), you might consider the following:  · Talk with a qualified substance abuse counselor and consider entering a treatment program.  · Go to a detox facility if necessary.  · If you were attending self-help group meetings, consider returning to them and go often.  · Explore other resources located near you (see sources " "listed below).  If you are unsure if you have a substance abuse problem, ask yourself the following questions:  · Have you been told by friends or family that drugs/alcohol has become a problem?  · Do you get into fights when drinking or using drugs?  · Do you have blackouts (not remembering what you do while using)?  · Do you lie about use or amounts of drugs or alcohol you consume?  · Do you need chemicals to get you going?  · Do you suffer in work or school performance because of drug or alcohol use?  · Do you get sick from drug or alcohol use but continue to use anyway?  · Do you need drugs or alcohol to relate to people or feel comfortable in social situations?  · Do you use drugs or alcohol to forget problems?  If you answered \"Yes\" to any of the above questions, it means you show signs of chemical dependency and a professional evaluation is suggested. The longer the use of drugs and alcohol continues, the problems will become greater.  SEEK IMMEDIATE MEDICAL CARE IF:   · You feel like you might repeat your problematic behavior.  · You need someone to talk to and feel that it should not wait.  · You feel you are a danger to yourself or someone else.  · You feel like you are having a new reaction to medications you are taking, or you are getting worse after leaving a care center.  · You have an overwhelming urge to drink or use drugs.  Addiction cannot be cured, but it can be treated successfully. Treatment centers are listed in the yellow pages under: Cocaine, Narcotics, and Alcoholics Anonymous. Most hospitals and clinics can refer you to a specialized care center. The  government maintains a toll-free number for obtaining treatment referrals: 1-470.652.7940 or 1-906.117.4046 (TDD) and maintains a website: http://findtreatment.samhsa.gov. Other websites for additional information are: www.mentalhealth.samhsa.gov. and www.[place].gov.  In [place] treatment resources are listed in each Province with listings " available under [employer] or similar titles.  Document Released: 12/20/2004 Document Revised: 03/11/2013 Document Reviewed: 11/11/2009  ExitCare® Patient Information ©2014 [employer].      · Is patient discharged on Warfarin / Coumadin?   No     Depression / Suicide Risk    As you are discharged from this [facility] facility, it is important to learn how to keep safe from harming yourself.    Recognize the warning signs:  · Abrupt changes in personality, positive or negative- including increase in energy   · Giving away possessions  · Change in eating patterns- significant weight changes-  positive or negative  · Change in sleeping patterns- unable to sleep or sleeping all the time   · Unwillingness or inability to communicate  · Depression  · Unusual sadness, discouragement and loneliness  · Talk of wanting to die  · Neglect of personal appearance   · Rebelliousness- reckless behavior  · Withdrawal from people/activities they love  · Confusion- inability to concentrate     If you or a loved one observes any of these behaviors or has concerns about self-harm, here's what you can do:  · Talk about it- your feelings and reasons for harming yourself  · Remove any means that you might use to hurt yourself (examples: pills, rope, extension cords, firearm)  · Get professional help from the community (Mental Health, Substance Abuse, psychological counseling)  · Do not be alone:Call your Safe Contact- someone whom you trust who will be there for you.  · Call your local CRISIS HOTLINE 007-5595 or 969-593-6367  · Call your local Children's Mobile Crisis Response Team [place] (302) 597-7246 or www.QikServe  · Call the toll free National Suicide Prevention Hotlines   · National Suicide Prevention Lifeline 443-748-XZBN (2167)  · National Hope Line Network 800-SUICIDE (203-3964)

## 2019-01-24 NOTE — DISCHARGE SUMMARY
Discharge Summary    CHIEF COMPLAINT ON ADMISSION  Chief Complaint   Patient presents with   • Drug Overdose     EMS reports pt had (120) 1mg tabs of Clonazepam filled 5 days ago and the pt only has 12 pills left. 88 missing pills.       Reason for Admission  EMS     Admission Date  1/20/2019    CODE STATUS  Full Code    HPI & HOSPITAL COURSE  This is a 58 y.o. male here with BZD overdose.       58-year-old gentleman who ingested approximately 105 1 mg Klonopin tablets over a 5-day period admitted on a psychiatric hold for intentional self-harm.  Patient was monitored on telemetry without arrhythmia, and without alterations in mental status, saturating well on room air, without need for supplemental O2.  Medically cleared for transfer, evaluation and treatment in the nearest accepting psychiatric facility.     To note patient presented with anxiety.  He has an underlying history of diabetes and hypertension, bipolar disorder.  He has been on clonazepam.  Presentation with benzodiazepine overdose, unable to rule out suicidal intent.  He was admitted to the hospital, remained stable with respect to medical complications of benzodiazepine overdose.  Seen by psychiatry, legal hold was extended which was placed on presentation.  Recommendations for inpatient psychiatric rehabilitation/transfer to a psychiatric hospital per psychiatry team.  He was maintained on his baseline regimen of Klonopin.  Abilify was added per psychiatry, Zyprexa was stopped.  In addition gabapentin was added.  Patient will need weaning of his benzodiazepines in a controlled setting at a psychiatric rehab facility.  At this time patient is being medically cleared for transfer to an inpatient psychiatric facility.  He is being discharged in stable condition.    Therefore, he is discharged in good and stable condition to a psychiatric hospital.    The patient met 2-midnight criteria for an inpatient stay at the time of discharge.    Discharge  Date  01/24/19    FOLLOW UP ITEMS POST DISCHARGE  F/U PCP following discharge from the psychiatric facility     DISCHARGE DIAGNOSES  Principal Problem:    Benzodiazepine overdose of undetermined intent POA: Yes  Active Problems:    Bipolar disorder (HCC) POA: Yes      Overview: Goes to Dr Sivakumar [facility]    Essential hypertension POA: Yes    Type 2 diabetes mellitus without complication (HCC) POA: Yes  Resolved Problems:    * No resolved hospital problems. *      FOLLOW UP  No future appointments.  No follow-up provider specified.    MEDICATIONS ON DISCHARGE     Medication List      START taking these medications      Instructions   aripiprazole 5 MG tablet  Commonly known as:  ABILIFY   Take 1 Tab by mouth every day.  Dose:  5 mg     gabapentin 300 MG Caps  Commonly known as:  NEURONTIN   Take 1 Cap by mouth 4 times a day.  Dose:  300 mg        CHANGE how you take these medications      Instructions   atorvastatin 40 MG Tabs  What changed:  See the new instructions.  Commonly known as:  LIPITOR   TAKE 1 TABLET BY MOUTH EVERYDAY AT BEDTIME     metFORMIN 500 MG Tabs  What changed:  when to take this  Commonly known as:  GLUCOPHAGE   TAKE 1 TABLET BY MOUTH TWICE A DAY WITH MEALS        CONTINUE taking these medications      Instructions   amLODIPine 10 MG Tabs  Commonly known as:  NORVASC   Take 1 Tab by mouth every day.  Dose:  10 mg     clonazePAM 1 MG Tabs  Commonly known as:  KLONOPIN   Take 1 mg by mouth 4 times a day.  Dose:  1 mg     DULoxetine 60 MG Cpep delayed-release capsule  Commonly known as:  CYMBALTA   Take 60 mg by mouth every day.  Dose:  60 mg     propranolol 40 MG Tabs  Commonly known as:  INDERAL   TAKE 1 TAB BY MOUTH 2 TIMES A DAY. STOP METOPROLOL        STOP taking these medications    olanzapine 10 MG tablet  Commonly known as:  ZYPREXA            Allergies  Allergies   Allergen Reactions   • Diflunisal      Sweating and weakness   • Lidocaine Nausea   • Lisinopril      DIZZINESS   •  Nabumetone Nausea   • Prednisone      Agitation  and Depression       DIET  Orders Placed This Encounter   Procedures   • Diet Order Regular (Paper or Plastic Utensils Only)     Standing Status:   Standing     Number of Occurrences:   1     Order Specific Question:   Diet:     Answer:   Regular [1]     Comments:   Paper or Plastic Utensils Only     Order Specific Question:   Miscellaneous modifications:     Answer:   [employer]  [2]       ACTIVITY  As tolerated.  Weight bearing as tolerated    CONSULTATIONS  Psychiatry     PROCEDURES  None    LABORATORY  Lab Results   Component Value Date    SODIUM 142 01/21/2019    POTASSIUM 3.6 01/21/2019    CHLORIDE 107 01/21/2019    CO2 25 01/21/2019    GLUCOSE 114 (H) 01/21/2019    BUN 11 01/21/2019    CREATININE 0.68 01/21/2019    CREATININE 1.2 01/18/2006        Lab Results   Component Value Date    WBC 7.8 01/20/2019    HEMOGLOBIN 15.1 01/20/2019    HEMATOCRIT 42.9 01/20/2019    PLATELETCT 250 01/20/2019        Total time of the discharge process exceeds 32 minutes.

## 2019-01-24 NOTE — DISCHARGE PLANNING
Agency/Facility Name: [facility]  Spoke To: Ashleigh  Outcome: Bed available.    Received Transport Form @ 5745  Spoke to Ephraim @ [facility]    Transport is scheduled for 1/24 @1500 going to [facility].    Agency/Facility Name: [facility]  Spoke To: Rebecca  Outcome: Trip # [id number]    Discharge summary faxed @ [phone]

## 2019-01-24 NOTE — PROGRESS NOTES
[facility] Medicine Daily Progress Note    Date of Service  1/23/2019    Chief Complaint  58 y.o. male admitted 1/20/2019 with benzodiazepine overdose.    Hospital Course    58-year-old gentleman who ingested approximately 105 1 mg Klonopin tablets over a 5-day period admitted on a psychiatric hold for intentional self-harm.  Patient was monitored on telemetry without arrhythmia, and without alterations in mental status, saturating well on room air, without need for supplemental O2.  Medically cleared for transfer, evaluation and treatment in the nearest accepting psychiatric facility.      Interval Problem Update  Patient is seen and evaluated on rounds  Discussed with nursing staff on rounds  Depressed, abnormal, slow affect - Persistent   Medically cleared for discharge, awaiting disposition to inpatient psychiatric facility  Otherwise patient has no complaints  No telemetry needs, can be transferred to medical regular nursing floor    Consultants/Specialty  Psychiatry    Code Status  Full code    Disposition  No telemetry needs, can be transferred to medical regular nursing floor  Awaiting disposition to inpatient psychiatric facility    Review of Systems  Review of Systems   Constitutional: Positive for malaise/fatigue. Negative for chills, diaphoresis, fever and weight loss.   HENT: Negative for hearing loss and tinnitus.    Eyes: Negative for blurred vision and double vision.   Respiratory: Negative for cough, hemoptysis, sputum production, shortness of breath and wheezing.    Cardiovascular: Negative for chest pain, palpitations, orthopnea, claudication, leg swelling and PND.   Gastrointestinal: Negative for abdominal pain, blood in stool, constipation, diarrhea, heartburn, melena, nausea and vomiting.   Genitourinary: Negative for dysuria, flank pain, frequency, hematuria and urgency.   Musculoskeletal: Negative for back pain, falls, joint pain, myalgias and neck pain.   Skin: Negative for itching and rash.    Neurological: Positive for weakness. Negative for dizziness, tingling, tremors, sensory change, speech change, focal weakness, seizures, loss of consciousness and headaches.   Psychiatric/Behavioral: Positive for depression. Negative for hallucinations, memory loss, substance abuse and suicidal ideas. The patient is nervous/anxious and has insomnia.         Physical Exam  Temp:  [36.1 °C (96.9 °F)-36.6 °C (97.9 °F)] 36.3 °C (97.4 °F)  Pulse:  [69-90] 69  Resp:  [17-18] 18  BP: (108-121)/(70-87) 120/83  SpO2:  [91 %-95 %] 91 %    Physical Exam   Constitutional: He is oriented to person, place, and time. He appears well-developed and well-nourished. No distress.   Body mass index is 37.61 kg/m².   HENT:   Head: Normocephalic and atraumatic.   Mouth/Throat: No oropharyngeal exudate.   Eyes: Pupils are equal, round, and reactive to light. Conjunctivae and EOM are normal. No scleral icterus.   Neck: No JVD present.   Cardiovascular: Normal rate, regular rhythm and normal heart sounds.  Exam reveals no gallop and no friction rub.    No murmur heard.  Pulmonary/Chest: Effort normal and breath sounds normal. No stridor. No respiratory distress. He has no wheezes. He has no rales.   Abdominal: Soft. Bowel sounds are normal. He exhibits no distension. There is no tenderness. There is no rebound and no guarding.   Musculoskeletal: Normal range of motion. He exhibits no edema, tenderness or deformity.   Neurological: He is alert and oriented to person, place, and time. No cranial nerve deficit. Coordination normal.   Skin: Skin is warm and dry. He is not diaphoretic. No erythema.   Psychiatric: His mood appears anxious. His speech is delayed. He is slowed. He exhibits a depressed mood.       Fluids    Intake/Output Summary (Last 24 hours) at 01/23/19 1704  Last data filed at 01/23/19 1000   Gross per 24 hour   Intake              358 ml   Output                0 ml   Net              358 ml       Current Facility-Administered  Medications:   •  SODIUM CHLORIDE 0.9 % IV SOLN, , , ,   •  clonazePAM (KLONOPIN) tablet 1 mg, 1 mg, Oral, 4X/DAY, Sarai Ponce M.D., 1 mg at 01/23/19 1210  •  ARIPiprazole (ABILIFY) tablet 5 mg, 5 mg, Oral, DAILY, Danuta Carranza M.D., 5 mg at 01/23/19 0620  •  DULoxetine (CYMBALTA) capsule 30 mg, 30 mg, Oral, DAILY, Danuta Carranza M.D., 30 mg at 01/23/19 0621  •  propranolol (INDERAL) tablet 20 mg, 20 mg, Oral, TID, Francois Cheng M.D., 20 mg at 01/23/19 1210  •  senna-docusate (PERICOLACE or SENOKOT S) 8.6-50 MG per tablet 2 Tab, 2 Tab, Oral, BID, 2 Tab at 01/23/19 0621 **AND** polyethylene glycol/lytes (MIRALAX) PACKET 1 Packet, 1 Packet, Oral, QDAY PRN **AND** magnesium hydroxide (MILK OF MAGNESIA) suspension 30 mL, 30 mL, Oral, QDAY PRN **AND** bisacodyl (DULCOLAX) suppository 10 mg, 10 mg, Rectal, QDAY PRN, Nigel Melendez M.D.  •  Respiratory Care per Protocol, , Nebulization, Continuous RT, Nigel Melendez M.D.  •  ondansetron (ZOFRAN) syringe/vial injection 4 mg, 4 mg, Intravenous, Q4HRS PRN, Nigel Melendez M.D.  •  ondansetron (ZOFRAN ODT) dispertab 4 mg, 4 mg, Oral, Q4HRS PRN, Nigel Melendez M.D.  •  enoxaparin (LOVENOX) inj 40 mg, 40 mg, Subcutaneous, DAILY, Nigel Melendez M.D., 40 mg at 01/23/19 0625  •  acetaminophen (TYLENOL) tablet 650 mg, 650 mg, Oral, Q6HRS PRN, Nigel Melendez M.D., 650 mg at 01/20/19 1447  •  gabapentin (NEURONTIN) capsule 300 mg, 300 mg, Oral, 4X/DAY, Igor León M.D., 300 mg at 01/23/19 1210  •  amLODIPine (NORVASC) tablet 10 mg, 10 mg, Oral, DAILY, Nigel Melendez M.D., 10 mg at 01/23/19 0620  •  atorvastatin (LIPITOR) tablet 40 mg, 40 mg, Oral, QHS, Nigel Melendez M.D., 40 mg at 01/22/19 2037  •  metFORMIN (GLUCOPHAGE) tablet 500 mg, 500 mg, Oral, BID WITH MEALS, Nigel Melendez M.D., 500 mg at 01/23/19 0933  •  haloperidol lactate (HALDOL) injection 2-5 mg, 2-5 mg, Intravenous, Q4HRS PRN, Nigel Melendez M.D.      Laboratory      Recent Labs       01/21/19   0327   SODIUM  142   POTASSIUM  3.6   CHLORIDE  107   CO2  25   GLUCOSE  114*   BUN  11   CREATININE  0.68   CALCIUM  9.0                   Imaging  DX-CHEST-PORTABLE (1 VIEW)   Final Result      No acute cardiopulmonary process is seen.           Assessment/Plan  * Benzodiazepine overdose of undetermined intent- (present on admission)   Assessment & Plan    Patient on legal hold, extended  Evaluated by psychiatry  Continue Klonopin home dose to avoid benzodiazepine withdrawal  Anticipate placement inpatient psychiatric rehab for benzodiazepine detoxification and monitoring of suicidal concerns  Appreciate psychiatry input     Bipolar disorder (HCC)- (present on admission)   Assessment & Plan    Continue current regimen  Further titration following discharge at inpatient psychiatric rehab hospital under guidance of psychiatrist      Type 2 diabetes mellitus without complication (HCC)- (present on admission)   Assessment & Plan    Continue metformin     Essential hypertension- (present on admission)   Assessment & Plan    Amlodipine and Inderal          VTE prophylaxis: Lovenox

## 2019-01-24 NOTE — CARE PLAN
Problem: Infection  Goal: Will remain free from infection  Outcome: PROGRESSING AS EXPECTED  RN educated pt on infection prevention, RN used thorough hand hygiene before and after interactions with pt, encouraged pt to use proper hand hygiene.       Problem: Knowledge Deficit  Goal: Knowledge of the prescribed therapeutic regimen will improve  Outcome: PROGRESSING AS EXPECTED  RN discussed prescribed medications with pt, educated on use and side effects, answered all questions.

## 2019-01-24 NOTE — PROGRESS NOTES
Bedside report received from day shift RN, RN assisted pt from chair back to bed, updated on POC and answered all questions, call light and personal belongings in reach, fall interventions and hourly rounding in place, RN reminded pt he must call before getting out of bed, pt is medical status, close obs in place.

## 2019-01-24 NOTE — PROGRESS NOTES
Patient discharged to [facility]. A&O x 4. Discharge instructions, personal belongings and prescriptions (Personal Klonopin bottle) in possession [facility]. PIV and tele monitor removed.  C[facility]ra complete. Patient verbalizes the understanding of the discharge instructions. Questions / concerns addressed prior to leaving the unit. Transported via walking. Report Given to [facility].

## 2019-02-13 ENCOUNTER — TELEPHONE (OUTPATIENT)
Dept: MEDICAL GROUP | Facility: PHYSICIAN GROUP | Age: 59
End: 2019-02-13

## 2019-02-14 NOTE — TELEPHONE ENCOUNTER
The patient's psychiatric nurse practitioner called me wanting to discuss the fact that the patient had overdosed recently on clonazepam.  He had been hoarding them.  She had been prescribing these for him.  She wants me to know that she is going to be monitoring him very closely and will be seeing him once a week and will be doing drug testing on him to be sure that he is taking his clonazepam as prescribed.  I think this is a wise way to proceed with this patient.  He is going to need to sign a contract with his psychiatric nurse practitioner regarding this situation.  Because of his history of overdoses, I am not going to prescribe any controlled substances for him.    Alvin Headley MD

## 2019-03-29 ENCOUNTER — OFFICE VISIT (OUTPATIENT)
Dept: URGENT CARE | Facility: PHYSICIAN GROUP | Age: 59
End: 2019-03-29

## 2019-03-29 VITALS
RESPIRATION RATE: 21 BRPM | SYSTOLIC BLOOD PRESSURE: 136 MMHG | BODY MASS INDEX: 35.89 KG/M2 | WEIGHT: 265 LBS | TEMPERATURE: 97.9 F | HEIGHT: 72 IN | OXYGEN SATURATION: 97 % | DIASTOLIC BLOOD PRESSURE: 100 MMHG | HEART RATE: 75 BPM

## 2019-03-29 DIAGNOSIS — Z76.0 ENCOUNTER FOR MEDICATION REFILL: ICD-10-CM

## 2019-03-29 PROCEDURE — 99212 OFFICE O/P EST SF 10 MIN: CPT | Performed by: NURSE PRACTITIONER

## 2019-03-29 ASSESSMENT — ENCOUNTER SYMPTOMS
MYALGIAS: 0
CHILLS: 0
FEVER: 0
NAUSEA: 0
COUGH: 0
SENSORY CHANGE: 0
WEAKNESS: 0
DIZZINESS: 0
NERVOUS/ANXIOUS: 1
TINGLING: 0
PALPITATIONS: 0
BACK PAIN: 0
HEADACHES: 0
ABDOMINAL PAIN: 0
ORTHOPNEA: 0
SHORTNESS OF BREATH: 0
FOCAL WEAKNESS: 0
VOMITING: 0

## 2019-03-29 NOTE — PROGRESS NOTES
Subjective:      Francois Luis is a 58 y.o. male who presents with Medication Refill (Metformin)            HPI  Francois is here for refill of his Metformin. States he ran out today. States blood sugar meter is broken, has plenty of lancets.  is presently having an anxiety attack due to being in clinic.  has h/o anxiety but is not taking anything for this at this time. Denies CP/pressure, SOB when out of the clinic. Next PCP appt on 4/16/19.    PMH:  has a past medical history of Anxiety; Arthritis; Bipolar affective (HCC); Depression; Disc disorder; Hypertension; Indigestion; Other specified disorder of intestines; and Type II or unspecified type diabetes mellitus without mention of complication, not stated as uncontrolled.  MEDS:   Current Outpatient Prescriptions:   •  atorvastatin (LIPITOR) 40 MG Tab, TAKE 1 TABLET BY MOUTH EVERYDAY AT BEDTIME, Disp: 90 Tab, Rfl: 3  •  metFORMIN (GLUCOPHAGE) 500 MG Tab, TAKE 1 TABLET BY MOUTH TWICE A DAY WITH MEALS, Disp: 180 Tab, Rfl: 3  •  DULoxetine (CYMBALTA) 60 MG Cap DR Particles delayed-release capsule, Take 60 mg by mouth every day., Disp: , Rfl:   •  amLODIPine (NORVASC) 10 MG Tab, Take 1 Tab by mouth every day., Disp: 90 Tab, Rfl: 3  •  propranolol (INDERAL) 40 MG Tab, TAKE 1 TAB BY MOUTH 2 TIMES A DAY. STOP METOPROLOL, Disp: 90 Tab, Rfl: 3  •  gabapentin (NEURONTIN) 300 MG Cap, Take 1 Cap by mouth 4 times a day., Disp: 90 Cap, Rfl:   •  ARIPiprazole (ABILIFY) 5 MG tablet, Take 1 Tab by mouth every day., Disp: 30 Tab, Rfl:   •  clonazepam (KLONOPIN) 1 MG Tab, Take 1 mg by mouth 4 times a day., Disp: , Rfl:   ALLERGIES:   Allergies   Allergen Reactions   • Diflunisal      Sweating and weakness   • Lidocaine Nausea   • Lisinopril      DIZZINESS   • Nabumetone Nausea   • Prednisone      Agitation  and Depression     SURGHX:   Past Surgical History:   Procedure Laterality Date   • SHOULDER DECOMPRESSION ARTHROSCOPIC  8/29/2012    Performed by CHELLE MUSTAFA at  SURGERY [facility] ORS   • CLAVICLE DISTAL EXCISION  8/29/2012    Performed by CHELLE MUSTAFA at [facility] ORS   • SHOULDER ARTHROSCOPY W/ BICIPITAL TENODESIS REPAIR  8/29/2012    Performed by CHELLE MUSTAFA at SURGERY [facility]   • KNEE ARTHROSCOPY  2/29/2012    left; Performed by CHELLE MUSTAFA at [facility] ORS   • MEDIAL MENISCECTOMY  2/29/2012    Performed by CHELLE MUSTAFA at [facility]   • CERVICAL DISK AND FUSION ANTERIOR  8/4/2009    Performed by ABDULAZIZ MONTERROSO at SURGERY [facility] ORS   • OTHER  1990    left heel spur removed   • OTHER SURGICAL PROCEDURE  1982    skin graft right hand   • CARPAL TUNNEL RELEASE  1980    B/L   • OTHER ORTHOPEDIC SURGERY  1980    right knee   • TONSILLECTOMY  1980     SOCHX:  reports that he has never smoked. He has never used smokeless tobacco. He reports that he does not drink alcohol or use drugs.  FH: Family history was reviewed, no pertinent findings to report    Review of Systems   Constitutional: Negative for chills, fever and malaise/fatigue.   Respiratory: Negative for cough and shortness of breath.    Cardiovascular: Negative for chest pain, palpitations and orthopnea.   Gastrointestinal: Negative for abdominal pain, nausea and vomiting.   Musculoskeletal: Negative for back pain and myalgias.   Neurological: Negative for dizziness, tingling, sensory change, focal weakness, weakness and headaches.   Psychiatric/Behavioral: The patient is nervous/anxious.    All other systems reviewed and are negative.         Objective:     /100   Pulse 75   Temp 36.6 °C (97.9 °F)   Resp (!) 21   Ht 1.829 m (6')   Wt 120.2 kg (265 lb)   SpO2 97%   BMI 35.94 kg/m²      Physical Exam   Constitutional: He is oriented to person, place, and time. He appears well-developed and well-nourished. He is active and cooperative.  Non-toxic appearance. He does not have a sickly appearance. He does not appear ill. No distress.   Appears very  "anxious, will increase respiratory rate when sitting in exam chair. Admits to feeling very anxious being in clinic. No diaphoresis or confusion, no Kussmaul respirations, no tri-pod positioning.   HENT:   Head: Normocephalic.   Eyes: Pupils are equal, round, and reactive to light. Conjunctivae and EOM are normal.   Neck: Normal range of motion.   Cardiovascular: Normal rate.    Pulmonary/Chest: Effort normal.   Musculoskeletal: Normal range of motion.   Neurological: He is alert and oriented to person, place, and time. He has normal strength. No cranial nerve deficit or sensory deficit. Coordination and gait normal. GCS eye subscore is 4. GCS verbal subscore is 5. GCS motor subscore is 6.   Skin: Skin is warm and dry. He is not diaphoretic.   Psychiatric: His speech is normal and behavior is normal. Judgment and thought content normal. His mood appears anxious. He is not actively hallucinating. Cognition and memory are normal. He is attentive.   Vitals reviewed.              Assessment/Plan:     1. Encounter for medication refill    -Looked at empty bottle of Metformin. Filled 1/29/19 for 60 pills, with \"10\" refills. Last refill by Dr. Headley on 1/23/19 for 180 tabs, 500 mg BID with 3 refills.   -Will call Pharmacy and find out if patient has refills for this and call him this morning, patient agrees to this plan of care    -Mercedes BENDER, called pharmacy this morning and they stated he had 9 refills left, she left message for patient at listed phone number in chart he can go pick this refill up, verified this number before he left.  "

## 2019-04-16 ENCOUNTER — OFFICE VISIT (OUTPATIENT)
Dept: MEDICAL GROUP | Facility: PHYSICIAN GROUP | Age: 59
End: 2019-04-16

## 2019-04-16 VITALS
SYSTOLIC BLOOD PRESSURE: 124 MMHG | DIASTOLIC BLOOD PRESSURE: 82 MMHG | HEART RATE: 100 BPM | BODY MASS INDEX: 35.21 KG/M2 | OXYGEN SATURATION: 97 % | TEMPERATURE: 98.6 F | HEIGHT: 72 IN | WEIGHT: 260 LBS

## 2019-04-16 DIAGNOSIS — E66.01 MORBID OBESITY WITH BMI OF 40.0-44.9, ADULT (HCC): ICD-10-CM

## 2019-04-16 DIAGNOSIS — F31.5 BIPOLAR DISORDER, CURRENT EPISODE DEPRESSED, SEVERE, WITH PSYCHOTIC FEATURES (HCC): ICD-10-CM

## 2019-04-16 DIAGNOSIS — I10 ESSENTIAL HYPERTENSION: ICD-10-CM

## 2019-04-16 DIAGNOSIS — E11.9 TYPE 2 DIABETES MELLITUS WITHOUT COMPLICATION, WITHOUT LONG-TERM CURRENT USE OF INSULIN (HCC): ICD-10-CM

## 2019-04-16 PROCEDURE — 99214 OFFICE O/P EST MOD 30 MIN: CPT | Performed by: FAMILY MEDICINE

## 2019-04-16 RX ORDER — BENAZEPRIL HYDROCHLORIDE 5 MG/1
5 TABLET ORAL DAILY
Qty: 90 TAB | Refills: 3 | Status: SHIPPED | OUTPATIENT
Start: 2019-04-16 | End: 2019-07-02 | Stop reason: SDUPTHER

## 2019-04-16 RX ORDER — DIAZEPAM 10 MG/1
10 TABLET ORAL EVERY 6 HOURS PRN
COMMUNITY
End: 2019-11-04

## 2019-04-16 ASSESSMENT — PAIN SCALES - GENERAL: PAINLEVEL: NO PAIN

## 2019-04-17 NOTE — PROGRESS NOTES
Patient comes in and says that sometimes his blood sugars are as high as 220.  I think we need to increase his metformin dose.  His blood pressures of also sometimes going as high as 190/100.  He has no chest pains no shortness of breath.  He suffers from bipolar disorder and had an overdose earlier this year.  He sees a psychiatrist regularly who manages all of his psychotropic medications including Valium.  He denies suicidal intent at this point.    I reviewed the following    Past Medical History:   Diagnosis Date   • Anxiety    • Arthritis    • Bipolar affective (HCC)    • Depression    • Disc disorder     Dr. Medina   • Hypertension    • Indigestion    • Other specified disorder of intestines     IBS   • Type II or unspecified type diabetes mellitus without mention of complication, not stated as uncontrolled         Past Surgical History:   Procedure Laterality Date   • SHOULDER DECOMPRESSION ARTHROSCOPIC  8/29/2012    Performed by CHELLE MUSTAFA at [facility]   • CLAVICLE DISTAL EXCISION  8/29/2012    Performed by CHELLE MUSTAFA at [facility]   • SHOULDER ARTHROSCOPY W/ BICIPITAL TENODESIS REPAIR  8/29/2012    Performed by CHELLE MUSTAFA at [facility] ORS   • KNEE ARTHROSCOPY  2/29/2012    left; Performed by CHELLE MUSTAFA at [facility]   • MEDIAL MENISCECTOMY  2/29/2012    Performed by CHELLE MUSTAFA at [facility]   • CERVICAL DISK AND FUSION ANTERIOR  8/4/2009    Performed by ABDULAZIZ MEDINA at SURGERY [facility]   • OTHER  1990    left heel spur removed   • OTHER SURGICAL PROCEDURE  1982    skin graft right hand   • CARPAL TUNNEL RELEASE  1980    B/L   • OTHER ORTHOPEDIC SURGERY  1980    right knee   • TONSILLECTOMY  1980       Allergies   Allergen Reactions   • Diflunisal      Sweating and weakness   • Lidocaine Nausea   • Lisinopril      DIZZINESS   • Nabumetone Nausea   • Prednisone      Agitation  and Depression       Current Outpatient  Prescriptions   Medication Sig Dispense Refill   • diazepam (VALIUM) 10 MG tablet Take 10 mg by mouth every 6 hours as needed for Anxiety.     • metFORMIN (GLUCOPHAGE) 500 MG Tab TAKE 2 TABLETS BY MOUTH TWICE A DAY WITH MEALS 360 Tab 3   • benazepril (LOTENSIN) 5 MG Tab Take 1 Tab by mouth every day. 90 Tab 3   • atorvastatin (LIPITOR) 40 MG Tab TAKE 1 TABLET BY MOUTH EVERYDAY AT BEDTIME 90 Tab 3   • gabapentin (NEURONTIN) 300 MG Cap Take 1 Cap by mouth 4 times a day. 90 Cap    • DULoxetine (CYMBALTA) 60 MG Cap DR Particles delayed-release capsule Take 60 mg by mouth every day.     • amLODIPine (NORVASC) 10 MG Tab Take 1 Tab by mouth every day. 90 Tab 3   • propranolol (INDERAL) 40 MG Tab TAKE 1 TAB BY MOUTH 2 TIMES A DAY. STOP METOPROLOL 90 Tab 3   • ARIPiprazole (ABILIFY) 5 MG tablet Take 1 Tab by mouth every day. 30 Tab    • clonazepam (KLONOPIN) 1 MG Tab Take 1 mg by mouth 4 times a day.       No current facility-administered medications for this visit.         Family History   Problem Relation Age of Onset   • Hypertension Father    • Cancer Father         oral cancer   • Hypertension Sister    • Cancer Unknown         lung cancer   • Diabetes Unknown    • Stroke Unknown        Social History     Social History   • Marital status: Single     Spouse name: N/A   • Number of children: N/A   • Years of education: N/A     Occupational History   • Not on file.     Social History Main Topics   • Smoking status: Never Smoker   • Smokeless tobacco: Never Used   • Alcohol use No   • Drug use: No   • Sexual activity: Not Currently     Other Topics Concern   • Not on file     Social History Narrative   • No narrative on file      The patient is well-developed well-nourished and in no acute distress but he is anxious    Pupils equally round and reactive to light    Ears normal    Nose normal    Throat clear    Neck supple no cervical adenopathy no thyromegaly    Chest clear to auscultation    Heart regular rhythm no murmur  S3 or S4 appreciated    Back no CVA pain or spasm    Abdomen flat soft good bowel sounds no mass No hepatosplenomegaly no rebound    Skin no rashes or suspicious lesions    DTRs 2+ in ankles knees and biceps    Babinski downgoing bilaterally    Pulses 2+ in all 4 extremities    1. Type 2 diabetes mellitus without complication, without long-term current use of insulin ([facility])  metFORMIN (GLUCOPHAGE) 500 MG Tab--increase to 2 p.o. twice daily with food    Comp Metabolic Panel    Lipid Profile    HEMOGLOBIN A1C    MICROALBUMIN CREAT RATIO URINE   2. Essential hypertension  benazepril (LOTENSIN) 5 MG Tab--at this to his medications    Comp Metabolic Panel    Lipid Profile   3. Morbid obesity with BMI of 40.0-44.9, adult ([facility])   continue working on losing weight   4. Bipolar disorder, current episode depressed, severe, with psychotic features ([facility])   continue to see psychiatrist       Please note that this dictation was created using voice recognition software. I have worked with consultants from the vendor as well as technical experts from [employer] [employer] to optimize the interface. I have made every reasonable attempt to correct obvious errors, but I expect that there are errors of grammar and possibly content that I did not discover before finalizing the note.    Recheck blood pressure and diabetes in 6 weeks.

## 2019-05-04 ENCOUNTER — HOSPITAL ENCOUNTER (OUTPATIENT)
Dept: RADIOLOGY | Facility: MEDICAL CENTER | Age: 59
End: 2019-05-04
Attending: FAMILY MEDICINE
Payer: MEDICAID

## 2019-05-04 ENCOUNTER — OFFICE VISIT (OUTPATIENT)
Dept: URGENT CARE | Facility: PHYSICIAN GROUP | Age: 59
End: 2019-05-04

## 2019-05-04 VITALS
BODY MASS INDEX: 35.65 KG/M2 | HEIGHT: 73 IN | OXYGEN SATURATION: 99 % | RESPIRATION RATE: 17 BRPM | WEIGHT: 269 LBS | HEART RATE: 90 BPM | SYSTOLIC BLOOD PRESSURE: 148 MMHG | TEMPERATURE: 99.6 F | DIASTOLIC BLOOD PRESSURE: 102 MMHG

## 2019-05-04 DIAGNOSIS — S89.90XA KNEE INJURY, UNSPECIFIED LATERALITY, INITIAL ENCOUNTER: ICD-10-CM

## 2019-05-04 DIAGNOSIS — S80.00XA CONTUSION OF KNEE, UNSPECIFIED LATERALITY, INITIAL ENCOUNTER: ICD-10-CM
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
## 2019-05-04 PROCEDURE — 73562 X-RAY EXAM OF KNEE 3: CPT | Mod: LT

## 2019-05-04 PROCEDURE — 99214 OFFICE O/P EST MOD 30 MIN: CPT | Performed by: FAMILY MEDICINE

## 2019-05-04 PROCEDURE — 73562 X-RAY EXAM OF KNEE 3: CPT | Mod: RT

## 2019-05-04 RX ORDER — OXYCODONE HYDROCHLORIDE AND ACETAMINOPHEN 5; 325 MG/1; MG/1
1-2 TABLET ORAL EVERY 8 HOURS PRN
Qty: 10 TAB | Refills: 0 | Status: SHIPPED | OUTPATIENT
Start: 2019-05-04 | End: 2019-05-09

## 2019-05-04 ASSESSMENT — ENCOUNTER SYMPTOMS
ROS SKIN COMMENTS: NO ABRASION OR LACERATION
BACK PAIN: 0
ABDOMINAL PAIN: 0
FLANK PAIN: 0
FOCAL WEAKNESS: 0
NECK PAIN: 0
SENSORY CHANGE: 0
WEIGHT LOSS: 0

## 2019-05-04 NOTE — PROGRESS NOTES
"Subjective:      Francois Luis is a 58 y.o. male who presents with Knee Pain (Bilateral x 3 days. tripped.)            3 days bilateral knee pain due to ground-level fall striking knees to weight lifting barbell. Pain is mild to moderate at rest and more severe with ambulation.  There is swelling and abrasion. No active bleeding. Prior injury and surgery to both knees. S/p menisectomy bilateral. Some relief with NSAD. No other aggravating or alleviating factors.          Review of Systems   Constitutional: Negative for malaise/fatigue and weight loss.   HENT:        No head trauma   Cardiovascular: Negative for chest pain.   Gastrointestinal: Negative for abdominal pain.   Genitourinary: Negative for flank pain and hematuria.   Musculoskeletal: Negative for back pain and neck pain.   Skin:        No abrasion or laceration     Neurological: Negative for sensory change and focal weakness.     .  Medications, Allergies, and current problem list reviewed today in Epic       Objective:     /102   Pulse 90   Temp 37.6 °C (99.6 °F)   Resp 17   Ht 1.854 m (6' 1\")   Wt 122 kg (269 lb)   SpO2 99%   BMI 35.49 kg/m²      Physical Exam   Constitutional: He is oriented to person, place, and time. He appears well-developed and well-nourished. No distress.   HENT:   Head: Normocephalic and atraumatic.   Neck: Normal range of motion. Neck supple.   Musculoskeletal:   Bilateral knees: Tender anterior aspect including bony tenderness of patella.  Extensor mechanism intact.  Soft tissue swelling, bruising, and partial-thickness abrasions.  Stable.  Distal neurovascular intact.   Neurological: He is alert and oriented to person, place, and time.   Skin: Skin is warm and dry.   Psychiatric:   Appears anxious               Assessment/Plan:   X-ray bilateral knees negative for fracture    1. Fall, initial encounter     2. Knee injury, unspecified laterality, initial encounter  CANCELED: DX-KNEE COMPLETE 4+ LEFT    CANCELED: " DX-KNEE COMPLETE 4+ RIGHT   3. Contusion of knee, unspecified laterality, initial encounter  oxyCODONE-acetaminophen (PERCOCET) 5-325 MG Tab    Consent for Opiate Prescription       Differential diagnosis, natural history, supportive care, and indications for immediate follow-up discussed at length.     Discussed high opiate abuse potential with patient. Limited to 10 tablets.

## 2019-05-09 ENCOUNTER — HOSPITAL ENCOUNTER (OUTPATIENT)
Dept: LAB | Facility: MEDICAL CENTER | Age: 59
End: 2019-05-09
Attending: FAMILY MEDICINE
Payer: MEDICAID

## 2019-05-29 ENCOUNTER — APPOINTMENT (OUTPATIENT)
Dept: MEDICAL GROUP | Facility: PHYSICIAN GROUP | Age: 59
End: 2019-05-29
Payer: MEDICAID

## 2019-06-19 ENCOUNTER — HOSPITAL ENCOUNTER (OUTPATIENT)
Dept: LAB | Facility: MEDICAL CENTER | Age: 59
End: 2019-06-19
Attending: FAMILY MEDICINE
Payer: MEDICAID

## 2019-06-19 DIAGNOSIS — E11.9 TYPE 2 DIABETES MELLITUS WITHOUT COMPLICATION, WITHOUT LONG-TERM CURRENT USE OF INSULIN (HCC): ICD-10-CM

## 2019-06-19 DIAGNOSIS — I10 ESSENTIAL HYPERTENSION: ICD-10-CM

## 2019-06-19 LAB
ALBUMIN SERPL BCP-MCNC: 4.1 G/DL (ref 3.2–4.9)
ALBUMIN/GLOB SERPL: 1.7 G/DL
ALP SERPL-CCNC: 72 U/L (ref 30–99)
ALT SERPL-CCNC: 59 U/L (ref 2–50)
ANION GAP SERPL CALC-SCNC: 14 MMOL/L (ref 0–11.9)
AST SERPL-CCNC: 40 U/L (ref 12–45)
BILIRUB SERPL-MCNC: 0.6 MG/DL (ref 0.1–1.5)
BUN SERPL-MCNC: 10 MG/DL (ref 8–22)
CALCIUM SERPL-MCNC: 9.6 MG/DL (ref 8.5–10.5)
CHLORIDE SERPL-SCNC: 101 MMOL/L (ref 96–112)
CHOLEST SERPL-MCNC: 174 MG/DL (ref 100–199)
CO2 SERPL-SCNC: 25 MMOL/L (ref 20–33)
CREAT SERPL-MCNC: 0.82 MG/DL (ref 0.5–1.4)
CREAT UR-MCNC: 96.8 MG/DL
EST. AVERAGE GLUCOSE BLD GHB EST-MCNC: 117 MG/DL
FASTING STATUS PATIENT QL REPORTED: NORMAL
GLOBULIN SER CALC-MCNC: 2.4 G/DL (ref 1.9–3.5)
GLUCOSE SERPL-MCNC: 96 MG/DL (ref 65–99)
HBA1C MFR BLD: 5.7 % (ref 0–5.6)
HDLC SERPL-MCNC: 60 MG/DL
LDLC SERPL CALC-MCNC: 82 MG/DL
MICROALBUMIN UR-MCNC: 1.4 MG/DL
MICROALBUMIN/CREAT UR: 14 MG/G (ref 0–30)
POTASSIUM SERPL-SCNC: 4.8 MMOL/L (ref 3.6–5.5)
PROT SERPL-MCNC: 6.5 G/DL (ref 6–8.2)
SODIUM SERPL-SCNC: 140 MMOL/L (ref 135–145)
TRIGL SERPL-MCNC: 159 MG/DL (ref 0–149)

## 2019-06-19 PROCEDURE — 80053 COMPREHEN METABOLIC PANEL: CPT

## 2019-06-19 PROCEDURE — 82570 ASSAY OF URINE CREATININE: CPT

## 2019-06-19 PROCEDURE — 36415 COLL VENOUS BLD VENIPUNCTURE: CPT

## 2019-06-19 PROCEDURE — 83036 HEMOGLOBIN GLYCOSYLATED A1C: CPT

## 2019-06-19 PROCEDURE — 82043 UR ALBUMIN QUANTITATIVE: CPT

## 2019-06-19 PROCEDURE — 80061 LIPID PANEL: CPT

## 2019-06-20 ENCOUNTER — TELEPHONE (OUTPATIENT)
Dept: MEDICAL GROUP | Facility: PHYSICIAN GROUP | Age: 59
End: 2019-06-20

## 2019-06-20 NOTE — TELEPHONE ENCOUNTER
----- Message from Alvin Headley M.D. sent at 6/19/2019  6:56 PM PDT -----    Dear Francois  Your chemistry panel looks good except for borderline elevated ALT--this is a liver function.  If you are drinking alcohol, I would recommend that you decrease your intake by one half.  Your blood sugar looks great at 96.  Your cholesterol levels look good.  Your urine microalbumin is normal.  Your glycohemoglobin is 5.7--this is good.    Alvin Headley MD

## 2019-06-20 NOTE — LETTER
June 20, 2019         Francois Luis  [address] [place] [address]        Dear Francois     Your chemistry panel looks good except for borderline elevated ALT--this is a liver function.  If you are drinking alcohol, I would recommend that you decrease your intake by one half.  Your blood sugar looks great at 96.   Your cholesterol levels look good.   Your urine microalbumin is normal.   Your glycohemoglobin is 5.7--this is good.     Alvin Headley MD    Resulted Orders   Comp Metabolic Panel   Result Value Ref Range    Sodium 140 135 - 145 mmol/L    Potassium 4.8 3.6 - 5.5 mmol/L    Chloride 101 96 - 112 mmol/L    Co2 25 20 - 33 mmol/L    Anion Gap 14.0 (H) 0.0 - 11.9    Glucose 96 65 - 99 mg/dL    Bun 10 8 - 22 mg/dL    Creatinine 0.82 0.50 - 1.40 mg/dL    Calcium 9.6 8.5 - 10.5 mg/dL    AST(SGOT) 40 12 - 45 U/L    ALT(SGPT) 59 (H) 2 - 50 U/L    Alkaline Phosphatase 72 30 - 99 U/L    Total Bilirubin 0.6 0.1 - 1.5 mg/dL    Albumin 4.1 3.2 - 4.9 g/dL    Total Protein 6.5 6.0 - 8.2 g/dL    Globulin 2.4 1.9 - 3.5 g/dL    A-G Ratio 1.7 g/dL    Narrative    Fast 8-10 hours, OK to drink water as needed during fast,  take medications per your provider's instructions.   Lipid Profile   Result Value Ref Range    Cholesterol,Tot 174 100 - 199 mg/dL    Triglycerides 159 (H) 0 - 149 mg/dL    HDL 60 >=40 mg/dL    LDL 82 <100 mg/dL    Narrative    Fast 8-10 hours, OK to drink water as needed during fast,  take medications per your provider's instructions.   HEMOGLOBIN A1C   Result Value Ref Range    Glycohemoglobin 5.7 (H) 0.0 - 5.6 %      Comment:      Increased risk for diabetes:  5.7 -6.4%  Diabetes:  >6.4%  Glycemic control for adults with diabetes:  <7.0%  The above interpretations are per ADA guidelines.  Diagnosis  of diabetes mellitus on the basis of elevated Hemoglobin A1c  should be confirmed by repeating the Hb A1c test.      Est Avg Glucose 117 mg/dL      Comment:      The eAG calculation is based on the  A1c-Derived Daily Glucose  (ADAG) study.  See the ADA's website for additional information.      Narrative    Fast 8-10 hours, OK to drink water as needed during fast,  take medications per your provider's instructions.   MICROALBUMIN CREAT RATIO URINE   Result Value Ref Range    Creatinine, Urine 96.80 mg/dL    Microalbumin, Urine Random 1.4 mg/dL    Micro Alb Creat Ratio 14 0 - 30 mg/g   FASTING STATUS   Result Value Ref Range    Fasting Status Fasting     Narrative    Fast 8-10 hours, OK to drink water as needed during fast,  take medications per your provider's instructions.   ESTIMATED GFR   Result Value Ref Range    GFR If African American >60 >60 mL/min/1.73 m 2    GFR If Non African American >60 >60 mL/min/1.73 m 2    Narrative    Fast 8-10 hours, OK to drink water as needed during fast,  take medications per your provider's instructions.       If you have any questions or concerns, please don't hesitate to call.    Electronically Signed

## 2019-07-02 ENCOUNTER — OFFICE VISIT (OUTPATIENT)
Dept: MEDICAL GROUP | Facility: PHYSICIAN GROUP | Age: 59
End: 2019-07-02

## 2019-07-02 VITALS
TEMPERATURE: 98.3 F | HEART RATE: 70 BPM | BODY MASS INDEX: 38.17 KG/M2 | OXYGEN SATURATION: 95 % | HEIGHT: 73 IN | RESPIRATION RATE: 16 BRPM | SYSTOLIC BLOOD PRESSURE: 140 MMHG | DIASTOLIC BLOOD PRESSURE: 90 MMHG | WEIGHT: 288 LBS

## 2019-07-02 DIAGNOSIS — M19.90 ARTHRITIS: ICD-10-CM

## 2019-07-02 DIAGNOSIS — E11.9 TYPE 2 DIABETES MELLITUS WITHOUT COMPLICATION, WITHOUT LONG-TERM CURRENT USE OF INSULIN (HCC): ICD-10-CM

## 2019-07-02 DIAGNOSIS — I10 ESSENTIAL HYPERTENSION: ICD-10-CM

## 2019-07-02 DIAGNOSIS — M48.02 CERVICAL STENOSIS OF SPINAL CANAL: ICD-10-CM

## 2019-07-02 DIAGNOSIS — F31.5 BIPOLAR DISORDER, CURRENT EPISODE DEPRESSED, SEVERE, WITH PSYCHOTIC FEATURES (HCC): ICD-10-CM

## 2019-07-02 PROCEDURE — 99214 OFFICE O/P EST MOD 30 MIN: CPT | Performed by: FAMILY MEDICINE

## 2019-07-02 RX ORDER — BENAZEPRIL HYDROCHLORIDE 10 MG/1
10 TABLET ORAL DAILY
Qty: 90 TAB | Refills: 3 | Status: SHIPPED | OUTPATIENT
Start: 2019-07-02 | End: 2019-11-04

## 2019-07-02 RX ORDER — ETODOLAC 400 MG/1
TABLET, FILM COATED ORAL
Qty: 180 TAB | Refills: 3 | Status: SHIPPED | OUTPATIENT
Start: 2019-07-02 | End: 2019-11-04

## 2019-07-02 NOTE — PROGRESS NOTES
Patient comes in to recheck his recent labs.  He has started drinking beer again.  He says he has at least 4 beers every night.  Since he started this his psychiatrist has taken him off Valium.  He is willing to cut back on his alcohol intake he says.  He complains of pains coming down the top of both arms especially at night.  We do have documented evidence of significant degenerative changes in his neck with a cervical MRI done in the past.  He denies any recent injuries to his neck.  He denies problems with his .  He has no chest pains and no shortness of breath.  His psychiatrist continues to see him and monitor him with his psychotropic medications.    I reviewed the following    Past Medical History:   Diagnosis Date   • Anxiety    • Arthritis    • Bipolar affective (HCC)    • Depression    • Disc disorder     Dr. Medina   • Hypertension    • Indigestion    • Other specified disorder of intestines     IBS   • Type II or unspecified type diabetes mellitus without mention of complication, not stated as uncontrolled         Past Surgical History:   Procedure Laterality Date   • SHOULDER DECOMPRESSION ARTHROSCOPIC  8/29/2012    Performed by CHELLE MUSTAFA at [facility]   • CLAVICLE DISTAL EXCISION  8/29/2012    Performed by CHELLE MUSTAFA at [facility]   • SHOULDER ARTHROSCOPY W/ BICIPITAL TENODESIS REPAIR  8/29/2012    Performed by CHELLE MUSTAFA at [facility]   • KNEE ARTHROSCOPY  2/29/2012    left; Performed by CHELLE MUSTAFA at [facility]   • MEDIAL MENISCECTOMY  2/29/2012    Performed by CHELLE MUSTAFA at [facility]   • CERVICAL DISK AND FUSION ANTERIOR  8/4/2009    Performed by ABDULAZIZ MEDINA at SURGERY [facility]S   • OTHER  1990    left heel spur removed   • OTHER SURGICAL PROCEDURE  1982    skin graft right hand   • CARPAL TUNNEL RELEASE  1980    B/L   • OTHER ORTHOPEDIC SURGERY  1980    right knee   • TONSILLECTOMY  1980       Allergies    Allergen Reactions   • Diflunisal      Sweating and weakness   • Lidocaine Nausea   • Lisinopril      DIZZINESS   • Nabumetone Nausea   • Prednisone      Agitation  and Depression       Current Outpatient Prescriptions   Medication Sig Dispense Refill   • etodolac (LODINE) 400 MG tablet TAKE 1 TAB BY MOUTH 2 TIMES A DAY. TAKE WITH FOOD 180 Tab 3   • benazepril (LOTENSIN) 10 MG Tab Take 1 Tab by mouth every day. 90 Tab 3   • metFORMIN (GLUCOPHAGE) 500 MG Tab TAKE 2 TABLETS BY MOUTH TWICE A DAY WITH MEALS 360 Tab 3   • atorvastatin (LIPITOR) 40 MG Tab TAKE 1 TABLET BY MOUTH EVERYDAY AT BEDTIME 90 Tab 3   • gabapentin (NEURONTIN) 300 MG Cap Take 1 Cap by mouth 4 times a day. 90 Cap    • ARIPiprazole (ABILIFY) 5 MG tablet Take 1 Tab by mouth every day. 30 Tab    • DULoxetine (CYMBALTA) 60 MG Cap DR Particles delayed-release capsule Take 60 mg by mouth every day.     • amLODIPine (NORVASC) 10 MG Tab Take 1 Tab by mouth every day. 90 Tab 3   • propranolol (INDERAL) 40 MG Tab TAKE 1 TAB BY MOUTH 2 TIMES A DAY. STOP METOPROLOL 90 Tab 3   • clonazepam (KLONOPIN) 1 MG Tab Take 1 mg by mouth 4 times a day.     • diazepam (VALIUM) 10 MG tablet Take 10 mg by mouth every 6 hours as needed for Anxiety.       No current facility-administered medications for this visit.         Family History   Problem Relation Age of Onset   • Hypertension Father    • Cancer Father         oral cancer   • Hypertension Sister    • Cancer Unknown         lung cancer   • Diabetes Unknown    • Stroke Unknown        Social History     Social History   • Marital status: Single     Spouse name: N/A   • Number of children: N/A   • Years of education: N/A     Occupational History   • Not on file.     Social History Main Topics   • Smoking status: Never Smoker   • Smokeless tobacco: Never Used   • Alcohol use No   • Drug use: No   • Sexual activity: Not Currently     Other Topics Concern   • Not on file     Social History Narrative   • No narrative on file       Hospital Outpatient Visit on 06/19/2019   Component Date Value   • Sodium 06/19/2019 140    • Potassium 06/19/2019 4.8    • Chloride 06/19/2019 101    • Co2 06/19/2019 25    • Anion Gap 06/19/2019 14.0*   • Glucose 06/19/2019 96    • Bun 06/19/2019 10    • Creatinine 06/19/2019 0.82    • Calcium 06/19/2019 9.6    • AST(SGOT) 06/19/2019 40    • ALT(SGPT) 06/19/2019 59*   • Alkaline Phosphatase 06/19/2019 72    • Total Bilirubin 06/19/2019 0.6    • Albumin 06/19/2019 4.1    • Total Protein 06/19/2019 6.5    • Globulin 06/19/2019 2.4    • A-G Ratio 06/19/2019 1.7    • Cholesterol,Tot 06/19/2019 174    • Triglycerides 06/19/2019 159*   • HDL 06/19/2019 60    • LDL 06/19/2019 82    • Glycohemoglobin 06/19/2019 5.7*   • Est Avg Glucose 06/19/2019 117    • Creatinine, Urine 06/19/2019 96.80    • Microalbumin, Urine Rand* 06/19/2019 1.4    • Micro Alb Creat Ratio 06/19/2019 14    • Fasting Status 06/19/2019 Fasting    • GFR If  06/19/2019 >60    • GFR If Non  Ameri* 06/19/2019 >60      Physical Exam   Constitutional: He is oriented. He appears well-developed and obese. No distress.   HENT:   Head: Normocephalic and atraumatic.   Right Ear: External ear normal. Ear canal and TM normal   Left Ear: External ear normal. Ear canal and TM normal   Nose: Nose normal.   Mouth/Throat: Oropharynx is clear and moist.   Eyes: Conjunctivae and extraocular motions are normal. Pupils are equal, round, and reactive to light.        Fundi benign bilaterally   Neck: No thyromegaly present.   Cardiovascular: Normal rate, regular rhythm, normal heart sounds and intact distal pulses.  Exam reveals no gallop.    No murmur heard.  Pulmonary/Chest: Effort normal and breath sounds normal. No respiratory distress. He has no wheezes. He has no rales.   Abdominal: Soft. Bowel sounds are normal. No hepatosplenomegaly. He exhibits no distension. No tenderness. He has no rebound and no guarding.   Musculoskeletal: Normal range  of motion. He exhibits no edema and no tenderness.   Lymphadenopathy:     He has no cervical adenopathy.  No supraclavicular adenopathy.   Neurological: He is alert and oriented. He has normal reflexes.        Babinskis downgoing bilaterally   Skin: Skin is warm and dry. No rash noted. No erythema.   Psychiatric: He has a normal mood but he has a flattened affect.  his behavior is normal.  He denies suicidal ideation.    1. Bipolar disorder, current episode depressed, severe, with psychotic features ([facility])   continue to see psychiatrist    I told him to cut his alcohol intake in half or better yet, to stop drinking alcohol.   2. Essential hypertension --not well controlled benazepril (LOTENSIN) 10 MG Tab--increase to this dose at 1 p.o. daily   3. Type 2 diabetes mellitus without complication, without long-term current use of insulin ([facility])   control is improving   4. Cervical stenosis of spinal canal  etodolac (LODINE) 400 MG tablet--1 p.o. twice daily with food   5. Arthritis  etodolac (LODINE) 400 MG tablet--1 p.o. twice daily with food     Please note that this dictation was created using voice recognition software. I have worked with consultants from the vendor as well as technical experts from [facility] to optimize the interface. I have made every reasonable attempt to correct obvious errors, but I expect that there are errors of grammar and possibly content that I did not discover before finalizing the note.    Recheck 3 months or as needed

## 2019-08-02 ENCOUNTER — OFFICE VISIT (OUTPATIENT)
Dept: URGENT CARE | Facility: PHYSICIAN GROUP | Age: 59
End: 2019-08-02

## 2019-08-02 ENCOUNTER — HOSPITAL ENCOUNTER (OUTPATIENT)
Dept: RADIOLOGY | Facility: MEDICAL CENTER | Age: 59
End: 2019-08-02
Attending: PHYSICIAN ASSISTANT
Payer: MEDICAID

## 2019-08-02 VITALS
RESPIRATION RATE: 20 BRPM | SYSTOLIC BLOOD PRESSURE: 142 MMHG | OXYGEN SATURATION: 97 % | TEMPERATURE: 97.3 F | DIASTOLIC BLOOD PRESSURE: 90 MMHG | WEIGHT: 289 LBS | HEIGHT: 72 IN | HEART RATE: 97 BPM | BODY MASS INDEX: 39.14 KG/M2

## 2019-08-02 DIAGNOSIS — S82.441A CLOSED DISPLACED SPIRAL FRACTURE OF SHAFT OF RIGHT FIBULA, INITIAL ENCOUNTER: ICD-10-CM

## 2019-08-02 DIAGNOSIS — M25.571 ACUTE RIGHT ANKLE PAIN: ICD-10-CM

## 2019-08-02 PROCEDURE — 99214 OFFICE O/P EST MOD 30 MIN: CPT | Performed by: PHYSICIAN ASSISTANT

## 2019-08-02 PROCEDURE — 73610 X-RAY EXAM OF ANKLE: CPT | Mod: RT

## 2019-08-02 RX ORDER — HYDROCODONE BITARTRATE AND ACETAMINOPHEN 10; 325 MG/1; MG/1
1 TABLET ORAL EVERY 6 HOURS PRN
Qty: 12 TAB | Refills: 0 | Status: SHIPPED | OUTPATIENT
Start: 2019-08-02 | End: 2019-08-05

## 2019-08-02 ASSESSMENT — ENCOUNTER SYMPTOMS
MUSCLE WEAKNESS: 0
NUMBNESS: 0
INABILITY TO BEAR WEIGHT: 0
LOSS OF SENSATION: 0
TINGLING: 0
LOSS OF MOTION: 1

## 2019-08-02 NOTE — PROGRESS NOTES
Subjective:   Francois Luis is a 59 y.o. male who presents for Ankle Injury (x last nigh, R ankle )        Patient planes of right ankle and calf pain x1 day.  Symptoms began after he tripped and twisted the ankle, describing an inversion type injury.  He is able to bear weight with great pain difficulty.  Denies prior injury.    Ankle Injury    The incident occurred 12 to 24 hours ago. The incident occurred at home. The injury mechanism was an inversion injury. The pain is present in the right ankle and right heel. The quality of the pain is described as aching. The pain is severe. The pain has been constant since onset. Associated symptoms include a loss of motion. Pertinent negatives include no inability to bear weight, loss of sensation, muscle weakness, numbness or tingling. He reports no foreign bodies present. The symptoms are aggravated by movement, palpation and weight bearing. He has tried ice, elevation and rest for the symptoms. The treatment provided no relief.     Review of Systems   Neurological: Negative for tingling and numbness.       PMH:  has a past medical history of Anxiety, Arthritis, Bipolar affective (HCC), Depression, Disc disorder, Hypertension, Indigestion, Other specified disorder of intestines, and Type II or unspecified type diabetes mellitus without mention of complication, not stated as uncontrolled.  MEDS:   Current Outpatient Medications:   •  etodolac (LODINE) 400 MG tablet, TAKE 1 TAB BY MOUTH 2 TIMES A DAY. TAKE WITH FOOD, Disp: 180 Tab, Rfl: 3  •  benazepril (LOTENSIN) 10 MG Tab, Take 1 Tab by mouth every day., Disp: 90 Tab, Rfl: 3  •  metFORMIN (GLUCOPHAGE) 500 MG Tab, TAKE 2 TABLETS BY MOUTH TWICE A DAY WITH MEALS, Disp: 360 Tab, Rfl: 3  •  atorvastatin (LIPITOR) 40 MG Tab, TAKE 1 TABLET BY MOUTH EVERYDAY AT BEDTIME, Disp: 90 Tab, Rfl: 3  •  DULoxetine (CYMBALTA) 60 MG Cap DR Particles delayed-release capsule, Take 60 mg by mouth every day., Disp: , Rfl:   •  amLODIPine  (NORVASC) 10 MG Tab, Take 1 Tab by mouth every day., Disp: 90 Tab, Rfl: 3  •  propranolol (INDERAL) 40 MG Tab, TAKE 1 TAB BY MOUTH 2 TIMES A DAY. STOP METOPROLOL, Disp: 90 Tab, Rfl: 3  •  diazepam (VALIUM) 10 MG tablet, Take 10 mg by mouth every 6 hours as needed for Anxiety., Disp: , Rfl:   •  gabapentin (NEURONTIN) 300 MG Cap, Take 1 Cap by mouth 4 times a day. (Patient not taking: Reported on 8/2/2019), Disp: 90 Cap, Rfl:   •  ARIPiprazole (ABILIFY) 5 MG tablet, Take 1 Tab by mouth every day. (Patient not taking: Reported on 8/2/2019), Disp: 30 Tab, Rfl:   •  clonazepam (KLONOPIN) 1 MG Tab, Take 1 mg by mouth 4 times a day., Disp: , Rfl:   ALLERGIES:   Allergies   Allergen Reactions   • Diflunisal      Sweating and weakness   • Lidocaine Nausea   • Lisinopril      DIZZINESS   • Nabumetone Nausea   • Prednisone      Agitation  and Depression     SURGHX:   Past Surgical History:   Procedure Laterality Date   • SHOULDER DECOMPRESSION ARTHROSCOPIC  8/29/2012    Performed by CHELLE MUSTAFA at SURGERY [facility]   • CLAVICLE DISTAL EXCISION  8/29/2012    Performed by CHELLE MUSTAFA at [facility]   • SHOULDER ARTHROSCOPY W/ BICIPITAL TENODESIS REPAIR  8/29/2012    Performed by CHELLE MUSTAFA at [facility]   • KNEE ARTHROSCOPY  2/29/2012    left; Performed by CHELLE MUSTAFA at [facility]   • MEDIAL MENISCECTOMY  2/29/2012    Performed by CHELLE MUSTAFA at [facility]   • CERVICAL DISK AND FUSION ANTERIOR  8/4/2009    Performed by ABDULAZIZ MONTERROSO at SURGERY [facility]   • OTHER  1990    left heel spur removed   • OTHER SURGICAL PROCEDURE  1982    skin graft right hand   • CARPAL TUNNEL RELEASE  1980    B/L   • OTHER ORTHOPEDIC SURGERY  1980    right knee   • TONSILLECTOMY  1980     SOCHX:  reports that he has never smoked. He has never used smokeless tobacco. He reports that he drinks about 14.0 oz of alcohol per week. He reports that he does not use drugs.  FH:  Family history was reviewed, no pertinent findings to report   Objective:   /90   Pulse 97   Temp 36.3 °C (97.3 °F) (Temporal)   Resp 20   Ht 1.829 m (6')   Wt (!) 131.1 kg (289 lb)   SpO2 97%   BMI 39.20 kg/m²   Physical Exam   Constitutional: He is oriented to person, place, and time. He appears well-developed and well-nourished.  Non-toxic appearance. No distress.   HENT:   Head: Normocephalic and atraumatic.   Right Ear: External ear normal.   Left Ear: External ear normal.   Nose: Nose normal.   Eyes: Conjunctivae and lids are normal.   Neck: Neck supple.   Cardiovascular: Normal rate and regular rhythm.   Pulses:       Dorsalis pedis pulses are 2+ on the right side.        Posterior tibial pulses are 2+ on the right side.   Pulmonary/Chest: Effort normal and breath sounds normal. No respiratory distress.   Abdominal: Normal appearance.   Musculoskeletal:        Right ankle: He exhibits decreased range of motion, swelling and ecchymosis. He exhibits no deformity, no laceration and normal pulse. Tenderness. Lateral malleolus, medial malleolus, AITFL and CF ligament tenderness found. No posterior TFL, no head of 5th metatarsal and no proximal fibula tenderness found. Achilles tendon exhibits pain (and swelling) and defect. Achilles tendon exhibits normal Jarvis's test results.   Neurological: He is alert and oriented to person, place, and time. No cranial nerve deficit or sensory deficit.   Neurovascularly intact distally.   Skin: Skin is warm, dry and intact. Capillary refill takes less than 2 seconds.   Psychiatric: He has a normal mood and affect. His speech is normal and behavior is normal. Judgment and thought content normal. Cognition and memory are normal.   Vitals reviewed.        Assessment/Plan:   1. Acute right ankle pain  - DX-ANKLE 3+ VIEWS RIGHT; Future    Achilles tendon appears to be intact. Due to significant bony tenderness of the lateral malleolus x-rays obtained to further  evaluate for fracture.    XR:  FINDINGS:  There is an acute spiral relatively nondisplaced fracture of the distal metaphysis of the right fibula.  There is overlying soft tissue swelling.  No dislocation is present.  No medial malleolar or posterior malleolar fractures identified.  The visualized tarsal bones are intact.      Impression       Acute relatively nondisplaced spiral fracture distal metaphysis right fibula.     Patient immobilized in posterior short leg splint with stirrup.  Patient neurovascularly intact following application of splint.  Patient fitted for crutches and advised that he may not bear weight until further evaluation.  He was also advised that he may not drive, as he cannot articulate that his ankle.  He verbalized understanding of this directive.    Patient to leave splint in place at all times.  He may shower but must cover it.  He may also apply ice, but it must be over top of the splint.  Rest and elevate.  Follow-up with orthopedics-referral placed and patient was given a copy of the referral.    Patient may use narcotic analgesia as directed for the next 2 to 3 days.  On day 2 or 3 I would like him to transition to Tylenol and ibuprofen.  Signs and symptoms of compartment syndrome discussed with patient.  Should he develop any of these he is instructed to go directly to the emergency room for reevaluation.    Differential diagnosis, natural history, supportive care, and indications for immediate follow-up discussed.

## 2019-08-12 ENCOUNTER — HOSPITAL ENCOUNTER (EMERGENCY)
Facility: MEDICAL CENTER | Age: 59
End: 2019-08-12
Attending: EMERGENCY MEDICINE
Payer: MEDICAID

## 2019-08-12 ENCOUNTER — APPOINTMENT (OUTPATIENT)
Dept: RADIOLOGY | Facility: MEDICAL CENTER | Age: 59
End: 2019-08-12
Attending: EMERGENCY MEDICINE
Payer: MEDICAID

## 2019-08-12 VITALS
TEMPERATURE: 98 F | BODY MASS INDEX: 39.14 KG/M2 | HEIGHT: 72 IN | OXYGEN SATURATION: 95 % | SYSTOLIC BLOOD PRESSURE: 149 MMHG | HEART RATE: 94 BPM | RESPIRATION RATE: 20 BRPM | WEIGHT: 289 LBS | DIASTOLIC BLOOD PRESSURE: 99 MMHG

## 2019-08-12 DIAGNOSIS — R55 SYNCOPE, UNSPECIFIED SYNCOPE TYPE: ICD-10-CM

## 2019-08-12 LAB
ALBUMIN SERPL BCP-MCNC: 4.3 G/DL (ref 3.2–4.9)
ALBUMIN/GLOB SERPL: 1.5 G/DL
ALP SERPL-CCNC: 80 U/L (ref 30–99)
ALT SERPL-CCNC: 93 U/L (ref 2–50)
ANION GAP SERPL CALC-SCNC: 17 MMOL/L (ref 0–11.9)
AST SERPL-CCNC: 61 U/L (ref 12–45)
BASOPHILS # BLD AUTO: 0.2 % (ref 0–1.8)
BASOPHILS # BLD: 0.02 K/UL (ref 0–0.12)
BILIRUB SERPL-MCNC: 1.1 MG/DL (ref 0.1–1.5)
BUN SERPL-MCNC: 11 MG/DL (ref 8–22)
CALCIUM SERPL-MCNC: 10.3 MG/DL (ref 8.5–10.5)
CHLORIDE SERPL-SCNC: 97 MMOL/L (ref 96–112)
CO2 SERPL-SCNC: 18 MMOL/L (ref 20–33)
CREAT SERPL-MCNC: 0.94 MG/DL (ref 0.5–1.4)
EKG IMPRESSION: NORMAL
EOSINOPHIL # BLD AUTO: 0.02 K/UL (ref 0–0.51)
EOSINOPHIL NFR BLD: 0.2 % (ref 0–6.9)
ERYTHROCYTE [DISTWIDTH] IN BLOOD BY AUTOMATED COUNT: 46.5 FL (ref 35.9–50)
GLOBULIN SER CALC-MCNC: 2.8 G/DL (ref 1.9–3.5)
GLUCOSE SERPL-MCNC: 156 MG/DL (ref 65–99)
HCT VFR BLD AUTO: 47.8 % (ref 42–52)
HGB BLD-MCNC: 16.5 G/DL (ref 14–18)
IMM GRANULOCYTES # BLD AUTO: 0.05 K/UL (ref 0–0.11)
IMM GRANULOCYTES NFR BLD AUTO: 0.5 % (ref 0–0.9)
LYMPHOCYTES # BLD AUTO: 1.57 K/UL (ref 1–4.8)
LYMPHOCYTES NFR BLD: 14.9 % (ref 22–41)
MCH RBC QN AUTO: 33 PG (ref 27–33)
MCHC RBC AUTO-ENTMCNC: 34.5 G/DL (ref 33.7–35.3)
MCV RBC AUTO: 95.6 FL (ref 81.4–97.8)
MONOCYTES # BLD AUTO: 0.89 K/UL (ref 0–0.85)
MONOCYTES NFR BLD AUTO: 8.5 % (ref 0–13.4)
NEUTROPHILS # BLD AUTO: 7.98 K/UL (ref 1.82–7.42)
NEUTROPHILS NFR BLD: 75.7 % (ref 44–72)
NRBC # BLD AUTO: 0 K/UL
NRBC BLD-RTO: 0 /100 WBC
PLATELET # BLD AUTO: 339 K/UL (ref 164–446)
PMV BLD AUTO: 9.4 FL (ref 9–12.9)
POTASSIUM SERPL-SCNC: 4 MMOL/L (ref 3.6–5.5)
PROT SERPL-MCNC: 7.1 G/DL (ref 6–8.2)
RBC # BLD AUTO: 5 M/UL (ref 4.7–6.1)
SODIUM SERPL-SCNC: 132 MMOL/L (ref 135–145)
TROPONIN T SERPL-MCNC: 11 NG/L (ref 6–19)
WBC # BLD AUTO: 10.5 K/UL (ref 4.8–10.8)

## 2019-08-12 PROCEDURE — 70450 CT HEAD/BRAIN W/O DYE: CPT

## 2019-08-12 PROCEDURE — 84484 ASSAY OF TROPONIN QUANT: CPT

## 2019-08-12 PROCEDURE — 700111 HCHG RX REV CODE 636 W/ 250 OVERRIDE (IP): Performed by: EMERGENCY MEDICINE

## 2019-08-12 PROCEDURE — 80053 COMPREHEN METABOLIC PANEL: CPT

## 2019-08-12 PROCEDURE — 96374 THER/PROPH/DIAG INJ IV PUSH: CPT

## 2019-08-12 PROCEDURE — 85025 COMPLETE CBC W/AUTO DIFF WBC: CPT

## 2019-08-12 PROCEDURE — 99285 EMERGENCY DEPT VISIT HI MDM: CPT

## 2019-08-12 PROCEDURE — 700105 HCHG RX REV CODE 258: Performed by: EMERGENCY MEDICINE

## 2019-08-12 PROCEDURE — 93005 ELECTROCARDIOGRAM TRACING: CPT | Performed by: EMERGENCY MEDICINE

## 2019-08-12 RX ORDER — LORAZEPAM 2 MG/ML
2 INJECTION INTRAMUSCULAR ONCE
Status: COMPLETED | OUTPATIENT
Start: 2019-08-12 | End: 2019-08-12

## 2019-08-12 RX ORDER — SODIUM CHLORIDE 9 MG/ML
1000 INJECTION, SOLUTION INTRAVENOUS ONCE
Status: COMPLETED | OUTPATIENT
Start: 2019-08-12 | End: 2019-08-12

## 2019-08-12 RX ADMIN — SODIUM CHLORIDE 1000 ML: 9 INJECTION, SOLUTION INTRAVENOUS at 14:20

## 2019-08-12 RX ADMIN — LORAZEPAM 2 MG: 2 INJECTION INTRAMUSCULAR; INTRAVENOUS at 14:46

## 2019-08-12 NOTE — ED PROVIDER NOTES
ED Provider Note    CHIEF COMPLAINT  Chief Complaint   Patient presents with   • Syncope     got up from couch and fell back   • ETOH Withdrawal     no alcohol x2 d       HPI  Francois Luis is a 59 y.o. male who presents with a chief complaint of head injury, syncope alcohol withdrawal.  Patient is here primarily he sent from the alcohol recovery facility for syncope and head injury it occurred while he is getting up from the couch.  Feel like it is his head.  Associated bloody nose.  States he has slight headache in the back.  No nausea no vomiting.  Slightly dizzy    Patient states that this happens a lot he is been having this multiple times despite drinking or being sober.    Patient states that he has been sober for the last 2 days he states he been drinking alcohol most likely self-medication for significant anxiety.  He states yesterday 24 hours he just stayed in bed because he did not want to get up because he felt to go to the liquor store.  The patient states that when he got treatment.    REVIEW OF SYSTEMS  General: No fever or chills.  Eyes: No eye discharge. No eye pain.  Ear nose throat: No sore throat or  trouble swallowing.  Pulmonary: No shortness of breath or cough.  Cardiovascular: No chest pain or chest pressure.  GI: No abdominal pain nausea or vomiting.  : No dysuria or hematuria  Dermatologic: No rashes. No abrasions.  Neurologic: No weakness or numbness.  Psychiatric: Severe anxiety.  All other systems are negative    PAST MEDICAL HISTORY  Past Medical History:   Diagnosis Date   • Anxiety    • Arthritis    • Bipolar affective (HCC)    • Depression    • Disc disorder     Dr. Medina   • Hypertension    • Indigestion    • Other specified disorder of intestines     IBS   • Type II or unspecified type diabetes mellitus without mention of complication, not stated as uncontrolled        FAMILY HISTORY  Family History   Problem Relation Age of Onset   • Hypertension Father    • Cancer Father          oral cancer   • Hypertension Sister    • Cancer Other         lung cancer   • Diabetes Other    • Stroke Other        SOCIAL HISTORY  Social History     Socioeconomic History   • Marital status: Single     Spouse name: Not on file   • Number of children: Not on file   • Years of education: Not on file   • Highest education level: Not on file   Occupational History   • Not on file   Social Needs   • Financial resource strain: Not on file   • Food insecurity:     Worry: Not on file     Inability: Not on file   • Transportation needs:     Medical: Not on file     Non-medical: Not on file   Tobacco Use   • Smoking status: Never Smoker   • Smokeless tobacco: Never Used   Substance and Sexual Activity   • Alcohol use: Yes     Alcohol/week: 14.0 oz     Types: 28 Standard drinks or equivalent per week   • Drug use: No   • Sexual activity: Not Currently   Lifestyle   • Physical activity:     Days per week: Not on file     Minutes per session: Not on file   • Stress: Not on file   Relationships   • Social connections:     Talks on phone: Not on file     Gets together: Not on file     Attends [employer] service: Not on file     Active member of club or organization: Not on file     Attends meetings of clubs or organizations: Not on file     Relationship status: Not on file   • Intimate partner violence:     Fear of current or ex partner: Not on file     Emotionally abused: Not on file     Physically abused: Not on file     Forced sexual activity: Not on file   Other Topics Concern   • Not on file   Social History Narrative   • Not on file       SURGICAL HISTORY  Past Surgical History:   Procedure Laterality Date   • SHOULDER DECOMPRESSION ARTHROSCOPIC  8/29/2012    Performed by CHELLE MUSTAFA at [facility] ORS   • CLAVICLE DISTAL EXCISION  8/29/2012    Performed by CHELLE MUSTAFA at [facility] ORS   • SHOULDER ARTHROSCOPY W/ BICIPITAL TENODESIS REPAIR  8/29/2012    Performed by CHELLE MUSTAFA at [facility]   • KNEE ARTHROSCOPY  2/29/2012    left; Performed by CHELLE MUSTAFA at SURGERY [facility] ORS   • MEDIAL MENISCECTOMY  2/29/2012    Performed by CHELLE MUSTAFA at SURGERY [facility] ORS   • CERVICAL DISK AND FUSION ANTERIOR  8/4/2009    Performed by ABDULAZIZ MONTERROSO at SURGERY [facility] ORS   • OTHER  1990    left heel spur removed   • OTHER SURGICAL PROCEDURE  1982    skin graft right hand   • CARPAL TUNNEL RELEASE  1980    B/L   • OTHER ORTHOPEDIC SURGERY  1980    right knee   • TONSILLECTOMY  1980       CURRENT MEDICATIONS  No current facility-administered medications on file prior to encounter.      Current Outpatient Medications on File Prior to Encounter   Medication Sig Dispense Refill   • etodolac (LODINE) 400 MG tablet TAKE 1 TAB BY MOUTH 2 TIMES A DAY. TAKE WITH FOOD 180 Tab 3   • benazepril (LOTENSIN) 10 MG Tab Take 1 Tab by mouth every day. 90 Tab 3   • diazepam (VALIUM) 10 MG tablet Take 10 mg by mouth every 6 hours as needed for Anxiety.     • metFORMIN (GLUCOPHAGE) 500 MG Tab TAKE 2 TABLETS BY MOUTH TWICE A DAY WITH MEALS 360 Tab 3   • atorvastatin (LIPITOR) 40 MG Tab TAKE 1 TABLET BY MOUTH EVERYDAY AT BEDTIME 90 Tab 3   • gabapentin (NEURONTIN) 300 MG Cap Take 1 Cap by mouth 4 times a day. (Patient not taking: Reported on 8/2/2019) 90 Cap    • ARIPiprazole (ABILIFY) 5 MG tablet Take 1 Tab by mouth every day. (Patient not taking: Reported on 8/2/2019) 30 Tab    • DULoxetine (CYMBALTA) 60 MG Cap DR Particles delayed-release capsule Take 60 mg by mouth every day.     • amLODIPine (NORVASC) 10 MG Tab Take 1 Tab by mouth every day. 90 Tab 3   • propranolol (INDERAL) 40 MG Tab TAKE 1 TAB BY MOUTH 2 TIMES A DAY. STOP METOPROLOL 90 Tab 3   • clonazepam (KLONOPIN) 1 MG Tab Take 1 mg by mouth 4 times a day.         ALLERGIES  Allergies   Allergen Reactions   • Diflunisal      Sweating and weakness   • Lidocaine Nausea   • Lisinopril      DIZZINESS   • Nabumetone Nausea   • Prednisone       Agitation  and Depression       PHYSICAL EXAM  VITAL SIGNS: /93   Pulse (!) 108   Temp 36.7 °C (98 °F) (Temporal)   Resp 20   Ht 1.829 m (6')   Wt (!) 131.1 kg (289 lb)   BMI 39.20 kg/m²  Room air O2: 98    Constitutional: Well developed, Well nourished, No acute distress, Non-toxic appearance.   HENT: Normocephalic atraumatic negative raccoon eyes negative smith sign.  Oropharynx is dry  Eyes: Pupils equal round react light anicteric sclera  Neck: Normal range of motion, No tenderness, Supple, No stridor.   Cardiovascular: Normal heart rate, Normal rhythm, No murmurs, No rubs, No gallops.   Thorax & Lungs: Normal breath sounds, No respiratory distress, No wheezing, No chest tenderness.   Abdomen: Bowel sounds normal, Soft, No tenderness, No masses, No pulsatile masses.   Skin: Warm, Dry, No erythema, No rash.   Back: No tenderness, No CVA tenderness.   Extremities: Intact distal pulses, No edema, No tenderness, No cyanosis, No clubbing.   Neurologic: Cranial nerves II through XII grossly intact good finger-nose negative pronator drift  Psychiatric: Anxious, tremulous.        RADIOLOGY/PROCEDURES  CT-HEAD W/O   Final Result      1.  No acute intracranial findings.      2.  Diffuse atrophy and periventricular white matter change, consistent with chronic small vessel disease.           Results for orders placed or performed during the hospital encounter of 08/12/19   CBC WITH DIFFERENTIAL   Result Value Ref Range    WBC 10.5 4.8 - 10.8 K/uL    RBC 5.00 4.70 - 6.10 M/uL    Hemoglobin 16.5 14.0 - 18.0 g/dL    Hematocrit 47.8 42.0 - 52.0 %    MCV 95.6 81.4 - 97.8 fL    MCH 33.0 27.0 - 33.0 pg    MCHC 34.5 33.7 - 35.3 g/dL    RDW 46.5 35.9 - 50.0 fL    Platelet Count 339 164 - 446 K/uL    MPV 9.4 9.0 - 12.9 fL    Neutrophils-Polys 75.70 (H) 44.00 - 72.00 %    Lymphocytes 14.90 (L) 22.00 - 41.00 %    Monocytes 8.50 0.00 - 13.40 %    Eosinophils 0.20 0.00 - 6.90 %    Basophils 0.20 0.00 - 1.80 %    Immature  Granulocytes 0.50 0.00 - 0.90 %    Nucleated RBC 0.00 /100 WBC    Neutrophils (Absolute) 7.98 (H) 1.82 - 7.42 K/uL    Lymphs (Absolute) 1.57 1.00 - 4.80 K/uL    Monos (Absolute) 0.89 (H) 0.00 - 0.85 K/uL    Eos (Absolute) 0.02 0.00 - 0.51 K/uL    Baso (Absolute) 0.02 0.00 - 0.12 K/uL    Immature Granulocytes (abs) 0.05 0.00 - 0.11 K/uL    NRBC (Absolute) 0.00 K/uL   COMP METABOLIC PANEL   Result Value Ref Range    Sodium 132 (L) 135 - 145 mmol/L    Potassium 4.0 3.6 - 5.5 mmol/L    Chloride 97 96 - 112 mmol/L    Co2 18 (L) 20 - 33 mmol/L    Anion Gap 17.0 (H) 0.0 - 11.9    Glucose 156 (H) 65 - 99 mg/dL    Bun 11 8 - 22 mg/dL    Creatinine 0.94 0.50 - 1.40 mg/dL    Calcium 10.3 8.5 - 10.5 mg/dL    AST(SGOT) 61 (H) 12 - 45 U/L    ALT(SGPT) 93 (H) 2 - 50 U/L    Alkaline Phosphatase 80 30 - 99 U/L    Total Bilirubin 1.1 0.1 - 1.5 mg/dL    Albumin 4.3 3.2 - 4.9 g/dL    Total Protein 7.1 6.0 - 8.2 g/dL    Globulin 2.8 1.9 - 3.5 g/dL    A-G Ratio 1.5 g/dL   TROPONIN   Result Value Ref Range    Troponin T 11 6 - 19 ng/L   ESTIMATED GFR   Result Value Ref Range    GFR If African American >60 >60 mL/min/1.73 m 2    GFR If Non African American >60 >60 mL/min/1.73 m 2   EKG (NOW)   Result Value Ref Range    Report       [facility] Emergency Dept.    Test Date:  2019  Pt Name:    FRANCOIS FISHER                Department: ER  MRN:        [id number]                      Room:        20  Gender:     Male                         Technician: [id number]  :        1960                   Requested By:FRANCOIS MATTSON  Order #:    [id number]                    Devorah MD: Francois MATTSON MD    Measurements  Intervals                                Axis  Rate:       95                           P:          -44  OH:         133                          QRS:        3  QRSD:       94                           T:          -26  QT:         355  QTc:        447    Interpretive Statements  Twelve-lead  EKG interpreted by me shows soreness sinus rhythm.  Rate of 95.  Normal LA.  Normal QRS.  And flattened T waves throughout.  This shows no  significant changes from previous EKG dated 1/20/2019.  Interpreted by me  showed  no acute conduction abnormalities no prolonged QT phase.     Electronically Signed On 8- 16:16:45 PDT by Francois MATTSON MD          COURSE & MEDICAL DECISION MAKING  Pertinent Labs & Imaging studies reviewed. (See chart for details)  Head injury.  The patient at this point had syncope which appears to be a chronic issue.  Patient also tachycardic and anxious appearing.  At this point, the most likely patient is self-medicating with alcohol.  He is also going through withdrawal getting him some Ativan to head CT.  Check labs the patient may be hyperglycemic or hypoglycemic secondary to his diabetes.  The patient will be given IV fluids the patient appears tachycardic and mildly dehydrated.  With his history of head injury I do not think oral hydration will be key at this time.  Patient was given oral and IV hydration.    Patient states that he has had this before.  Is been going on for several weeks.    The patient at this point appears to have chronic syncope.  We talked about good techniques of pain his feet over the bed making sure that he waits before he gets up.  I believe that since the patient has been withdrawing from alcohol this is probably been exacerbating this condition.  If it continues I want him to come back.  I do want to follow-up with his primary care physician.    His tremors are now better.  Oropharynx now looks moist he appears to be well-hydrated and improved.  And the patient be safely discharged home.    FINAL IMPRESSION  1.  Syncope  2.  Mild head injury  3.      Electronically signed by: Francois Mattson, 8/12/2019 2:31 PM

## 2019-08-12 NOTE — ED TRIAGE NOTES
Pt bib ems from [facility]. Pt was checking in for detox.  Chief Complaint   Patient presents with   • Syncope     got up from couch and fell back   • ETOH Withdrawal     no alcohol x2 d     Pt anxious, tachy. .  Pt in a gown, connected to monitor. Chart up for ERP.

## 2019-08-13 NOTE — ED NOTES
Cab arranged for pt. Pt states understanding of dc instructions and f/u care. Pt able to amb, assisted out in WC.

## 2019-08-13 NOTE — DISCHARGE PLANNING
Medical Social Work    Referral: Transportation    Intervention: LSW was notified that pt is ready for discharge and pt requesting to go to [facility] for ETOH detox. LSW called [facility] and spoke w/ Michelle who states that the pt can come for an assessment. Michelle also confirmed that they do have a male bed open.     LSW called [facility] and spoke w/ Vera to arrange transport for pt from the hospital to [facility] detox clinic. [employer] cab was arranged and will  pt from ER lobby as soon as possible. Bedside RN updated.     Plan: Pt to transfer to [facility] via [facility].

## 2019-08-19 ENCOUNTER — TELEPHONE (OUTPATIENT)
Dept: URGENT CARE | Facility: PHYSICIAN GROUP | Age: 59
End: 2019-08-19

## 2019-08-19 NOTE — TELEPHONE ENCOUNTER
Pt contacted [facility] was wondering if we can refill     trazadone and Lithium(filled by detox clinic), Dr on site     stated we are unable to.     Pt would like to know if pcp can refill meds until next detox cricket.

## 2019-08-19 NOTE — TELEPHONE ENCOUNTER
Katlyn--I can't find either of these meds on his chart--the meds must be refilled by his Psychiatrist.  Alvin Headley M.D.

## 2019-11-04 ENCOUNTER — APPOINTMENT (OUTPATIENT)
Dept: RADIOLOGY | Facility: MEDICAL CENTER | Age: 59
End: 2019-11-04
Attending: EMERGENCY MEDICINE
Payer: MEDICAID

## 2019-11-04 ENCOUNTER — HOSPITAL ENCOUNTER (EMERGENCY)
Facility: MEDICAL CENTER | Age: 59
End: 2019-11-05
Attending: EMERGENCY MEDICINE
Payer: MEDICAID

## 2019-11-04 DIAGNOSIS — F41.9 ANXIETY: Primary | ICD-10-CM

## 2019-11-04 DIAGNOSIS — R45.851 SUICIDAL IDEATION: ICD-10-CM

## 2019-11-04 DIAGNOSIS — G56.22 ULNAR NEUROPATHY OF LEFT UPPER EXTREMITY: ICD-10-CM

## 2019-11-04 DIAGNOSIS — F32.A DEPRESSION, UNSPECIFIED DEPRESSION TYPE: ICD-10-CM

## 2019-11-04 LAB
AMPHET UR QL SCN: NEGATIVE
BARBITURATES UR QL SCN: NEGATIVE
BENZODIAZ UR QL SCN: POSITIVE
BZE UR QL SCN: NEGATIVE
CANNABINOIDS UR QL SCN: NEGATIVE
METHADONE UR QL SCN: NEGATIVE
OPIATES UR QL SCN: NEGATIVE
OXYCODONE UR QL SCN: NEGATIVE
PCP UR QL SCN: NEGATIVE
POC BREATHALIZER: 0.01 PERCENT (ref 0–0.01)
PROPOXYPH UR QL SCN: NEGATIVE

## 2019-11-04 PROCEDURE — 73080 X-RAY EXAM OF ELBOW: CPT | Mod: LT

## 2019-11-04 PROCEDURE — 700102 HCHG RX REV CODE 250 W/ 637 OVERRIDE(OP): Performed by: EMERGENCY MEDICINE

## 2019-11-04 PROCEDURE — 302970 POC BREATHALIZER: Performed by: EMERGENCY MEDICINE

## 2019-11-04 PROCEDURE — 99285 EMERGENCY DEPT VISIT HI MDM: CPT

## 2019-11-04 PROCEDURE — 700111 HCHG RX REV CODE 636 W/ 250 OVERRIDE (IP): Performed by: EMERGENCY MEDICINE

## 2019-11-04 PROCEDURE — 96372 THER/PROPH/DIAG INJ SC/IM: CPT

## 2019-11-04 PROCEDURE — A9270 NON-COVERED ITEM OR SERVICE: HCPCS | Performed by: EMERGENCY MEDICINE

## 2019-11-04 PROCEDURE — 80307 DRUG TEST PRSMV CHEM ANLYZR: CPT

## 2019-11-04 RX ORDER — CLONIDINE HYDROCHLORIDE 0.1 MG/1
0.2 TABLET ORAL TWICE DAILY
Status: DISCONTINUED | OUTPATIENT
Start: 2019-11-04 | End: 2019-11-05 | Stop reason: HOSPADM

## 2019-11-04 RX ORDER — HYDROXYZINE PAMOATE 50 MG/1
50 CAPSULE ORAL 3 TIMES DAILY PRN
COMMUNITY

## 2019-11-04 RX ORDER — HALOPERIDOL 5 MG/ML
5 INJECTION INTRAMUSCULAR ONCE
Status: COMPLETED | OUTPATIENT
Start: 2019-11-04 | End: 2019-11-04

## 2019-11-04 RX ORDER — LORAZEPAM 2 MG/ML
1 INJECTION INTRAMUSCULAR ONCE
Status: COMPLETED | OUTPATIENT
Start: 2019-11-04 | End: 2019-11-04

## 2019-11-04 RX ORDER — DULOXETIN HYDROCHLORIDE 30 MG/1
30 CAPSULE, DELAYED RELEASE ORAL DAILY
COMMUNITY

## 2019-11-04 RX ORDER — CLONIDINE HYDROCHLORIDE 0.2 MG/1
0.2 TABLET ORAL 2 TIMES DAILY
COMMUNITY

## 2019-11-04 RX ORDER — DIAZEPAM 5 MG/1
5 TABLET ORAL ONCE
Status: COMPLETED | OUTPATIENT
Start: 2019-11-04 | End: 2019-11-04

## 2019-11-04 RX ADMIN — CLONIDINE HYDROCHLORIDE 0.2 MG: 0.1 TABLET ORAL at 21:10

## 2019-11-04 RX ADMIN — LORAZEPAM 1 MG: 2 INJECTION INTRAMUSCULAR; INTRAVENOUS at 21:11

## 2019-11-04 RX ADMIN — HALOPERIDOL LACTATE 5 MG: 5 INJECTION, SOLUTION INTRAMUSCULAR at 21:11

## 2019-11-04 RX ADMIN — DIAZEPAM 5 MG: 5 TABLET ORAL at 18:22

## 2019-11-05 VITALS
SYSTOLIC BLOOD PRESSURE: 98 MMHG | BODY MASS INDEX: 39.15 KG/M2 | OXYGEN SATURATION: 96 % | DIASTOLIC BLOOD PRESSURE: 61 MMHG | HEIGHT: 72 IN | HEART RATE: 70 BPM | RESPIRATION RATE: 14 BRPM | WEIGHT: 289.02 LBS | TEMPERATURE: 97.8 F

## 2019-11-05 NOTE — CONSULTS
[facility] evaluated patient. Approved inpatient hospitalization.  [facility] assessment in chart.  Will inform social work regarding patient status.

## 2019-11-05 NOTE — ED NOTES
Pt now sleeping. NAD noted. Visible chest rise and fall noted. Pt in line of sight of RN station. Charge made aware of need for sitter.

## 2019-11-05 NOTE — DISCHARGE PLANNING
Alert Team  Noted consult order placed.  Pt will need:  -medical clearance per the ERP.  Also, please have PAR complete registration prior to consult.    Pt currently #3 on wait list for consults this evening.  Will likely be a few hours after shift change.

## 2019-11-05 NOTE — DISCHARGE PLANNING
Medical Social Work    Referral: Legal hold Transfer to Mental Health Facility    Intervention: RITU received call from Renan at [facility] stating that Dr. Rubio has accepted the patient for admission.  Renan is requesting that transport be arranged for 0630 due to other admissions.    RITU arranged for transportation to be set up through [place] with [facility].    Pt has transport benefit through [facility]; spoke with Shreya at [facility] to arrange stretcher transport.     The pt will be picked up at 0630.     RITU notified the RN of the departure time as well as accepting facility.     RITU created transfer packet and placed on chart. Original Legal Hold placed in packet.     Plan: Pt will transfer to [facility] at 0630.

## 2019-11-05 NOTE — DISCHARGE PLANNING
Medical Social Work    Referral: Legal Hold    Intervention: Legal Hold Paperwork given to SW by [facility] RN: Sha    Legal Hold Initiated: Date: 11/04/2019  Time: 1710    Legal Hold faxed: Date: 11/04/2019  Time: 5105    Patient’s Insurance Listed on Face Sheet: [place] [employer]    Referrals sent to: [facility]    Plan: Patient will transfer to mental health facility once acceptance is obtained.

## 2019-11-05 NOTE — ED NOTES
Pt sleeping comfortably in bed, breathing is easy and unlabored, Pt turns self, sitter at bedside, Will continue to monitor.

## 2019-11-05 NOTE — ED NOTES
Patient resting in bed at this time, equal chest rise and fall. Will continue high risk 1:1 monitoring

## 2019-11-05 NOTE — ED PROVIDER NOTES
ED Provider Note    3:32 AM  The pt is nontoxic appearing, comfortable, and afebrile. He has no elbow pain that is acute. He has no recent fall. He states that his pain has been present for over three months.      DX-ELBOW-COMPLETE 3+ LEFT   Final Result         1.  No acute traumatic bony injury.   2.  Corticated ossific density adjacent to the lateral epicondyle, appearance suggests possible remote injury or unfused apophysis.           Pt will be going to a mental health facility.  No splint or cast necessary at this time. On re exam, he has no tenderness to ROM.

## 2019-11-05 NOTE — ED NOTES
Received report from Anthony CHRISTIANSON. Pt resting comfortably in bed, All pt care responsibilities assumed

## 2019-11-05 NOTE — ED NOTES
Pt sleeping comfortably in bed, breathing is easy and unlabored, Pt has sitter at bedside, No new needs identified will continue to monitor.

## 2019-11-05 NOTE — DISCHARGE INSTRUCTIONS
Follow-up with your doctor regarding that numbness in the hand, I suspect that the ulnar nerve has been injured.  They may want to do more testing and referral based upon those results.

## 2019-11-05 NOTE — ED NOTES
Pt sleeping comfortably in bed, breathing is easy and unlabored, sitter at bedside, Will continue to monitor.

## 2019-11-05 NOTE — ED PROVIDER NOTES
"ED Provider Note    CHIEF COMPLAINT  \"I am just so anxious.\"  HPI  Francois Luis is a 59 y.o. male who presents with the above chief complaint.  Apparently he was seeing his regular nurse practitioner today, who points out that he was extremely anxious.  The patient feels like he has been failing his medications.  Is been on Valium which is not really helping.  He has been sober for over a month.  But he still just feels very anxious.  He feels like he is at his wits and he feels very hopeless and helpless.  He is thought about killing himself.  On the transfer notes he talks about a plan with a gun.  He does admit to having guns but he is not thought about shooting himself.  He feels extremely anxious if he is about to crawl out of his skin.  He denies any headache, neck pain or chest pain.  No belly pain.  He does have some ongoing numbness of his left upper extremity at his small and ring finger.  Initially denied any trauma, however then points out that he was falling quite a bit about a month and a half ago, and this is what prompted him to stop drinking.  Regardless, he wonders he may have hurt his elbow at that time.  He did have a large hematoma it sounds, which is gotten better.  However it still feels like there is a foreign body or chip in it.  Denies any weakness or numbness elsewhere.  There is no other complaint.    PAST MEDICAL HISTORY  Past Medical History:   Diagnosis Date   • Anxiety    • Arthritis    • Bipolar affective (HCC)    • Depression    • Disc disorder     Dr. Medina   • Hypertension    • Indigestion    • Other specified disorder of intestines     IBS   • Type II or unspecified type diabetes mellitus without mention of complication, not stated as uncontrolled        FAMILY HISTORY  Family History   Problem Relation Age of Onset   • Hypertension Father    • Cancer Father         oral cancer   • Hypertension Sister    • Cancer Other         lung cancer   • Diabetes Other    • Stroke Other  "       SOCIAL HISTORY  Social History     Tobacco Use   • Smoking status: Never Smoker   • Smokeless tobacco: Never Used   Substance Use Topics   • Alcohol use: Yes     Alcohol/week: 14.0 oz     Types: 28 Standard drinks or equivalent per week   • Drug use: No         SURGICAL HISTORY  Past Surgical History:   Procedure Laterality Date   • SHOULDER DECOMPRESSION ARTHROSCOPIC  8/29/2012    Performed by CHELLE MUSTAFA at SURGERY [facility]   • CLAVICLE DISTAL EXCISION  8/29/2012    Performed by CHELLE MUSTAFA at SURGERY [facility] ORS   • SHOULDER ARTHROSCOPY W/ BICIPITAL TENODESIS REPAIR  8/29/2012    Performed by CHELLE MUSTAFA at SURGERY [facility]   • KNEE ARTHROSCOPY  2/29/2012    left; Performed by CHELLE MUSTAFA at SURGERY [facility] ORS   • MEDIAL MENISCECTOMY  2/29/2012    Performed by CHELLE MUSTAFA at [facility]   • CERVICAL DISK AND FUSION ANTERIOR  8/4/2009    Performed by ABDULAZIZ MONTERROSO at SURGERY [facility] ORS   • OTHER  1990    left heel spur removed   • OTHER SURGICAL PROCEDURE  1982    skin graft right hand   • CARPAL TUNNEL RELEASE  1980    B/L   • OTHER ORTHOPEDIC SURGERY  1980    right knee   • TONSILLECTOMY  1980       CURRENT MEDICATIONS  Home Medications     Reviewed by Brock Lewis (Pharmacy Tech) on 11/04/19 at 1912  Med List Status: Complete   Medication Last Dose Status   amLODIPine (NORVASC) 10 MG Tab 11/4/2019 Active   atorvastatin (LIPITOR) 40 MG Tab 11/3/2019 Active   cloNIDine (CATAPRESS) 0.2 MG Tab 11/4/2019 Active   DULoxetine (CYMBALTA) 30 MG Cap DR Particles 11/4/2019 Active   DULoxetine (CYMBALTA) 60 MG Cap DR Particles delayed-release capsule 11/4/2019 Active   hydrOXYzine pamoate (VISTARIL) 50 MG Cap 11/4/2019 Active   metFORMIN (GLUCOPHAGE) 500 MG Tab 11/4/2019 Active   propranolol (INDERAL) 40 MG Tab 11/4/2019 Active                I have reviewed the nurses notes and/or the list brought with the patient.    ALLERGIES  Allergies   Allergen Reactions    • Diflunisal      Sweating and weakness   • Lidocaine Nausea   • Lisinopril      DIZZINESS   • Nabumetone Nausea   • Prednisone      Agitation  and Depression       REVIEW OF SYSTEMS  See HPI for further details. Review of systems as above, otherwise all other systems are negative.     PHYSICAL EXAM  VITAL SIGNS: BP (!) 171/108   Pulse 72   Temp 36.5 °C (97.7 °F) (Temporal)   Resp 20   SpO2 99%     Constitutional: Seems extremely anxious.  HENT: Mucus membranes moist.  Oropharynx is clear.  Eyes: Pupils equally round.  No scleral icterus.   Neck: Full nontender range of motion.  Lymphatic: No cervical lymphadenopathy noted.   Cardiovascular: Regular heart rate and rhythm.  No murmurs, rubs, nor gallop appreciated.   Thorax & Lungs: Chest is nontender.  Lungs are clear to auscultation with good air movement bilaterally.  No wheeze, rhonchi, nor rales.   Abdomen: Soft, with no tenderness, rebound nor guarding.  No mass, pulsatile mass, nor hepatosplenomegaly appreciated.  Skin: No purpura nor petechia noted.  Extremities/Musculoskeletal: No sign of trauma.  Calves are nontender with no cords nor edema.  No Ellie's sign.  Pulses are intact all around.   Neurologic: Alert & oriented.  There is some numbness over his small and ring finger in the left hand.  He has difficulty crossing his fingers.  Gait is normal.  Psychiatric: Very anxious    LABS  Labs Reviewed   URINE DRUG SCREEN - Abnormal; Notable for the following components:       Result Value    Benzodiazepines Positive (*)     All other components within normal limits   POC BREATHALIZER - Normal         RADIOLOGY/PROCEDURES  I have reviewed the patient's film interpretations myself, and they are read out by the radiologist as:   DX-ELBOW-COMPLETE 3+ LEFT    (Results Pending)     .    MEDICAL RECORD  I have reviewed patient's medical record and pertinent results are listed above.    COURSE & MEDICAL DECISION MAKING  I have reviewed any medical record  information, laboratory studies and radiographic results as noted above.  Patient presents with extreme anxiety, feels hopeless and helpless.  He is already on legal hold.  At this point, he is given a dose of Valium.  He is observed still very anxious.  We are going to give him a dose of Haldol and Ativan intramuscularly.  I have asked nursing to figure out what medicines he supposed to be on at home and restart those.  One at least appears to be clonidine.  I have ordered that while they sort out the rest of his meds.  At this point, I have added on the medical clearance portion of his legal hold.  With regards to that numbness in his hand, this would seem to be a ulnar neuropathy.  Could be related to his fall.  I did ask him to follow-up with his personal doctor regarding this, he may need specialty referral based upon results of this work-up.  I have asked life skills to see the patient to facilitate psychiatric placement for stabilization.    FINAL IMPRESSION  1. Anxiety    2. Suicidal ideation    3. Depression, unspecified depression type    4. Ulnar neuropathy of left upper extremity               This dictation was created using voice recognition software.    Electronically signed by: Mckay Jimenez, 11/4/2019 8:36 PM

## 2021-03-15 DIAGNOSIS — Z23 NEED FOR VACCINATION: ICD-10-CM

## 2023-04-24 NOTE — TELEPHONE ENCOUNTER
Left message to call back     Resident stable and problem well controlled. Will continue same orders and treatment  On [facility]

## 2024-09-17 NOTE — ED TRIAGE NOTES
Detail Level: Detailed Pt pato Benavides on L2k from [facility], seen there by regular Psych NP for depression. Reports plan to end his life with gun. 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 Pt agreeable, speaking in full sentences, a, o x4.